# Patient Record
Sex: FEMALE | Race: WHITE | NOT HISPANIC OR LATINO | Employment: FULL TIME | URBAN - METROPOLITAN AREA
[De-identification: names, ages, dates, MRNs, and addresses within clinical notes are randomized per-mention and may not be internally consistent; named-entity substitution may affect disease eponyms.]

---

## 2017-07-26 ENCOUNTER — ALLSCRIPTS OFFICE VISIT (OUTPATIENT)
Dept: OTHER | Facility: OTHER | Age: 22
End: 2017-07-26

## 2018-01-15 NOTE — PROGRESS NOTES
Assessment    1  Encounter for contraceptive management (V25 9) (Z30 9)   2  Encounter for preventive health examination (V70 0) (Z00 00)    Plan  Encounter for contraceptive management    · Loestrin 1/20 (21) 1-20 MG-MCG Oral Tablet (Norethindrone Acet-Ethinyl Est); take  one tablet by mouth every day    Discussion/Summary  health maintenance visit Currently, she has an adequate exercise regimen  the risks and benefits of cervical cancer screening were discussed cervical cancer screening is not indicated Breast cancer screening: the risks and benefits of breast cancer screening were discussed, monthly self breast exam was advised and breast cancer screening is not indicated  Colorectal cancer screening: colorectal cancer screening is not indicated  The immunizations are up to date  Advice and education were given regarding nutrition, aerobic exercise, reproductive health, contraception, alcohol use, sunscreen use and seat belt use  Patient discussion: discussed with the patient  We had a lengthy discussion about OCP's and possible risks/benefits and possible side effects  She will use "Sunday start" method  Start pap at age 25  Follow up PRN or yearly  The patient was counseled regarding instructions for management, risk factor reductions  Chief Complaint  pt present for CPE  af/rma      History of Present Illness  HM, Adult Female: The patient is being seen for a health maintenance evaluation  General Health: The patient's health since the last visit is described as good  She has regular dental visits  She denies vision problems  She denies hearing loss  Immunizations status: up to date  Lifestyle:  She consumes a diverse and healthy diet  She does not have any weight concerns  She exercises regularly  She does not use tobacco  She denies alcohol use  She denies drug use  Reproductive health: the patient is premenopausal    Screening: cancer screening reviewed and updated     metabolic screening reviewed and updated  risk screening reviewed and updated  HPI: Here for CPE  Would like to talk about OCP's  Has been sexually active but not currently  Uses a condom  No family history of DVT, does not smoke  Review of Systems    Constitutional: No fever, no chills, feels well, no tiredness, no recent weight gain or weight loss  Eyes: No complaints of eye pain, no red eyes, no eyesight problems, no discharge, no dry eyes, no itching of eyes  ENT: no complaints of earache, no loss of hearing, no nose bleeds, no nasal discharge, no sore throat, no hoarseness  Cardiovascular: No complaints of slow heart rate, no fast heart rate, no chest pain, no palpitations, no leg claudication, no lower extremity edema  Respiratory: No complaints of shortness of breath, no wheezing, no cough, no SOB on exertion, no orthopnea, no PND  Gastrointestinal: No complaints of abdominal pain, no constipation, no nausea or vomiting, no diarrhea, no bloody stools  Genitourinary: No complaints of dysuria, no incontinence, no pelvic pain, no dysmenorrhea, no vaginal discharge or bleeding  Musculoskeletal: No complaints of arthralgias, no myalgias, no joint swelling or stiffness, no limb pain or swelling  Integumentary: No complaints of skin rash or lesions, no itching, no skin wounds, no breast pain or lump  Neurological: No complaints of headache, no confusion, no convulsions, no numbness, no dizziness or fainting, no tingling, no limb weakness, no difficulty walking  Psychiatric: Not suicidal, no sleep disturbance, no anxiety or depression, no change in personality, no emotional problems  Endocrine: No complaints of proptosis, no hot flashes, no muscle weakness, no deepening of the voice, no feelings of weakness  Hematologic/Lymphatic: No complaints of swollen glands, no swollen glands in the neck, does not bleed easily, does not bruise easily  Active Problems    1   Acute upper respiratory infection (465 9) (J06 9)   2  Arthralgia (719 40) (M25 50)   3  Encounter for routine child health examination w/o abnormal findings (V20 2) (Z00 129)   4  Exercise-induced bronchospasm (493 81) (J45 990)   5  Memory Lapses Or Loss (780 93)    Past Medical History    · History of Acute maxillary sinusitis (461 0) (J01 00)   · History of Acute Post-traumatic Stress Disorder (V15 4)   · Encounter for routine child health examination w/o abnormal findings (V20 2) (Z00 129)   · History of acute bronchitis (V12 69) (Z87 09)   · History of acute bronchitis (V12 69) (Z87 09)   · History of acute conjunctivitis (V12 49) (Z86 69)   · History of concussion (V15 52) (Z87 820)   · History of Late Effects Of Sprain Or Strain (905 7)   · History of Limb pain (729 5) (M79 609)   · History of Sprained Ribs (848 3)    Family History  Father    · Family history of Allergic Rhinitis    Social History    · Never A Smoker    Current Meds   1  Ventolin  (90 Base) MCG/ACT Inhalation Aerosol Solution; 2 puffs 30min prior to   exercise and every 4hrs prn shortness of breath, swish/spit after use; Therapy: [id number] to (Last Rx:[id number]) Ordered    Allergies    1  No Known Drug Allergies    Vitals   Recorded: [id number] 03:47PM   Temperature 97 1 F   Heart Rate 72   Respiration 16   Systolic 529   Diastolic 78   Height 5 ft 5 5 in   Weight 173 lb    BMI Calculated 28 35   BSA Calculated 1 87     Physical Exam    Constitutional   General appearance: No acute distress, well appearing and well nourished  Eyes   Conjunctiva and lids: No swelling, erythema or discharge  Pupils and irises: Equal, round, reactive to light  Ears, Nose, Mouth, and Throat   External inspection of ears and nose: Normal     Otoscopic examination: Tympanic membranes translucent with normal light reflex  Canals patent without erythema  Hearing: Normal     Nasal mucosa, septum, and turbinates: Normal without edema or erythema      Lips, teeth, and gums: Normal, good dentition  Oropharynx: Normal with no erythema, edema, exudate or lesions  Neck   Neck: Supple, symmetric, trachea midline, no masses  Thyroid: Normal, no thyromegaly  Pulmonary   Respiratory effort: No increased work of breathing or signs of respiratory distress  Percussion of chest: Normal     Palpation of chest: Normal     Auscultation of lungs: Clear to auscultation  Cardiovascular   Palpation of heart: Normal PMI, no thrills  Auscultation of heart: Normal rate and rhythm, normal S1 and S2, no murmurs  Carotid pulses: 2+ bilaterally  Abdominal aorta: Normal     Femoral pulses: 2+ bilaterally  Pedal pulses: 2+ bilaterally  Examination of extremities for edema and/or varicosities: Normal     Abdomen   Abdomen: Non-tender, no masses  Liver and spleen: No hepatomegaly or splenomegaly  Examination for hernias: No hernia appreciated  Lymphatic   Palpation of lymph nodes in neck: No lymphadenopathy  Palpation of lymph nodes in axillae: No lymphadenopathy  Palpation of lymph nodes in groin: No lymphadenopathy  Palpation of lymph nodes in other areas: No lymphadenopathy  Musculoskeletal   Gait and station: Normal     Digits and nails: Normal without clubbing or cyanosis  Joints, bones, and muscles: Normal     Range of motion: Normal     Stability: Normal     Muscle strength/tone: Normal     Skin   Skin and subcutaneous tissue: Normal without rashes or lesions  Palpation of skin and subcutaneous tissue: Normal turgor  Neurologic   Cranial nerves: Cranial nerves II-XII intact  Reflexes: 2+ and symmetric  Sensation: No sensory loss  Psychiatric   Judgment and insight: Normal     Orientation to person, place, and time: Normal     Recent and remote memory: Intact      Mood and affect: Normal        Procedure    Procedure:   Results: 20/30 in both eyes with corrective device, 20/40 in the right eye with corrective device, 20/25 in the left eye with corrective device      Signatures   Electronically signed by : JAYNE Funez ; Jul 26 2017  4:21PM EST                       (Author)

## 2018-01-22 VITALS
TEMPERATURE: 97.1 F | RESPIRATION RATE: 16 BRPM | DIASTOLIC BLOOD PRESSURE: 78 MMHG | HEART RATE: 72 BPM | SYSTOLIC BLOOD PRESSURE: 110 MMHG | BODY MASS INDEX: 27.8 KG/M2 | HEIGHT: 66 IN | WEIGHT: 173 LBS

## 2018-05-10 RX ORDER — NORETHINDRONE ACETATE AND ETHINYL ESTRADIOL 1; .02 MG/1; MG/1
1 TABLET ORAL DAILY
COMMUNITY
Start: 2017-07-26 | End: 2018-05-11

## 2018-05-10 RX ORDER — ALBUTEROL SULFATE 90 UG/1
AEROSOL, METERED RESPIRATORY (INHALATION)
COMMUNITY
Start: 2014-02-25 | End: 2018-09-19

## 2018-05-11 ENCOUNTER — OFFICE VISIT (OUTPATIENT)
Dept: FAMILY MEDICINE CLINIC | Facility: CLINIC | Age: 23
End: 2018-05-11
Payer: COMMERCIAL

## 2018-05-11 VITALS
HEIGHT: 65 IN | TEMPERATURE: 98 F | WEIGHT: 176 LBS | DIASTOLIC BLOOD PRESSURE: 82 MMHG | BODY MASS INDEX: 29.32 KG/M2 | RESPIRATION RATE: 18 BRPM | SYSTOLIC BLOOD PRESSURE: 108 MMHG | HEART RATE: 78 BPM

## 2018-05-11 DIAGNOSIS — H66.002 ACUTE SUPPURATIVE OTITIS MEDIA OF LEFT EAR WITHOUT SPONTANEOUS RUPTURE OF TYMPANIC MEMBRANE, RECURRENCE NOT SPECIFIED: Primary | ICD-10-CM

## 2018-05-11 PROCEDURE — 99213 OFFICE O/P EST LOW 20 MIN: CPT | Performed by: FAMILY MEDICINE

## 2018-05-11 RX ORDER — AZITHROMYCIN 250 MG/1
TABLET, FILM COATED ORAL
Qty: 6 TABLET | Refills: 0 | Status: SHIPPED | OUTPATIENT
Start: 2018-05-11 | End: 2018-05-16

## 2018-05-11 NOTE — PROGRESS NOTES
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        HPI  Few days  Dry then went cough  Worsening  np still  No fever but feverish  Sore throat   No n/v/c/d  No sick contacts really  Works in store  No otc tried but some cough drops      The following portions of the patient's history were reviewed and updated as appropriate: allergies, current medications, past family history, past medical history, past social history, past surgical history and problem list     Review of Systems   Respiratory: Positive for cough  Negative for wheezing  Current Outpatient Prescriptions   Medication Sig Dispense Refill    albuterol (VENTOLIN HFA) 90 mcg/act inhaler Inhale      norethindrone-ethinyl estradiol (LOESTRIN 1/20, 21,) 1-20 MG-MCG per tablet Take 1 tablet by mouth daily       No current facility-administered medications for this visit  Objective:    /82   Pulse 78   Temp 98 °F (36 7 °C)   Resp 18   Ht 5' 5" (1 651 m)   Wt 79 8 kg (176 lb)   LMP 04/11/2018   BMI 29 29 kg/m²        Physical Exam   Constitutional: She appears well-developed  HENT:   Head: Normocephalic  Right Ear: External ear normal    Left Ear: External ear normal    Mouth/Throat: No oropharyngeal exudate  Left TM red,coarse cough   Eyes: Conjunctivae are normal    Neck: Neck supple  Cardiovascular: Normal rate and intact distal pulses  Pulmonary/Chest: Effort normal  No respiratory distress  Abdominal: Soft  Musculoskeletal: She exhibits no edema or deformity  Neurological: She is alert  Skin: Skin is warm and dry  Psychiatric: Her behavior is normal  Thought content normal    Nursing note and vitals reviewed  Brenton Her, DO

## 2018-05-11 NOTE — PATIENT INSTRUCTIONS
Over-the-counter products may be used for your symptoms:    <<GUAIFENESIN>> is an expectorant that is useful for thick mucus  <<DEXTROMETHORPHAN>> is a cough suppressant that works in the brain to help reduce bothersome cough  <<ANTI-HISTAMINES>> are useful as allergy medications and to help dry up bothersome thin secretions  <<PSEUDOEPHEDRINE and PHENYLEPHRINE>> are stimulant decongestants that can be used for congestion and sinus pressure, however they be used with caution in those with high blood pressure or heart conditions      mucinex D is a suggestion today

## 2018-06-04 ENCOUNTER — OFFICE VISIT (OUTPATIENT)
Dept: FAMILY MEDICINE CLINIC | Facility: CLINIC | Age: 23
End: 2018-06-04
Payer: COMMERCIAL

## 2018-06-04 VITALS
SYSTOLIC BLOOD PRESSURE: 112 MMHG | RESPIRATION RATE: 16 BRPM | BODY MASS INDEX: 29.99 KG/M2 | HEIGHT: 65 IN | HEART RATE: 72 BPM | TEMPERATURE: 98.3 F | DIASTOLIC BLOOD PRESSURE: 72 MMHG | WEIGHT: 180 LBS

## 2018-06-04 DIAGNOSIS — J02.9 ACUTE PHARYNGITIS, UNSPECIFIED ETIOLOGY: Primary | ICD-10-CM

## 2018-06-04 LAB — S PYO AG THROAT QL: NEGATIVE

## 2018-06-04 PROCEDURE — 99213 OFFICE O/P EST LOW 20 MIN: CPT | Performed by: NURSE PRACTITIONER

## 2018-06-04 PROCEDURE — 87880 STREP A ASSAY W/OPTIC: CPT | Performed by: NURSE PRACTITIONER

## 2018-06-04 RX ORDER — FLUTICASONE PROPIONATE 50 MCG
2 SPRAY, SUSPENSION (ML) NASAL DAILY
Qty: 16 G | Refills: 0 | Status: SHIPPED | OUTPATIENT
Start: 2018-06-04 | End: 2021-10-13 | Stop reason: SDUPTHER

## 2018-06-04 NOTE — PATIENT INSTRUCTIONS
Start Flonase daily  Use Zyrtec D or Allegra D over the counter daily  Salt water gargles and hot tea with honey and lemon

## 2018-06-04 NOTE — PROGRESS NOTES
Assessment/Plan:    Problem List Items Addressed This Visit     None      Visit Diagnoses     Acute pharyngitis, unspecified etiology    -  Primary    Relevant Medications    fluticasone (FLONASE) 50 mcg/act nasal spray    Other Relevant Orders    POCT rapid strepA (Completed)          Patient Instructions   Start Flonase daily  Use Zyrtec D or Allegra D over the counter daily  Salt water gargles and hot tea with honey and lemon  Return if symptoms worsen or fail to improve  Subjective:      Patient ID: Sofi Orellana is a 25 y o  female  Chief Complaint   Patient presents with    Sore Throat     for 1 week prcma       She has had a sore throat and swollen tonsils for the past week  She has been feeling feverish  Mild sinus congestion and cough  She has been taking Advil OTC for symptoms  Treated for otitis media 1 month ago, ear pain resolved  No history of seasonal allergies  The following portions of the patient's history were reviewed and updated as appropriate: allergies, current medications, past family history, past medical history, past social history, past surgical history and problem list     Review of Systems   Constitutional: Positive for chills  Negative for fatigue and fever  HENT: Positive for congestion and sore throat  Negative for ear pain, postnasal drip, rhinorrhea and sinus pressure  Respiratory: Positive for cough  Negative for shortness of breath and wheezing  Cardiovascular: Negative for chest pain  Gastrointestinal: Negative for abdominal pain, diarrhea, nausea and vomiting  Musculoskeletal: Negative for arthralgias  Skin: Negative for rash  Neurological: Positive for headaches           Current Outpatient Prescriptions   Medication Sig Dispense Refill    albuterol (VENTOLIN HFA) 90 mcg/act inhaler Inhale      fluticasone (FLONASE) 50 mcg/act nasal spray 2 sprays into each nostril daily 16 g 0     No current facility-administered medications for this visit  Objective:    /72   Pulse 72   Temp 98 3 °F (36 8 °C)   Resp 16   Ht 5' 5" (1 651 m)   Wt 81 6 kg (180 lb)   LMP 05/16/2018 (Approximate)   BMI 29 95 kg/m²        Physical Exam   Constitutional: She appears well-developed and well-nourished  HENT:   Right Ear: Tympanic membrane, external ear and ear canal normal    Left Ear: Tympanic membrane, external ear and ear canal normal    Nose: Rhinorrhea (clear) present  No mucosal edema  Mouth/Throat: Oropharynx is clear and moist and mucous membranes are normal  No oropharyngeal exudate or posterior oropharyngeal erythema  Tonsils normal    Eyes: Conjunctivae are normal    Cardiovascular: Normal rate, regular rhythm and normal heart sounds  No murmur heard  Pulmonary/Chest: Effort normal and breath sounds normal    Abdominal: Bowel sounds are normal  She exhibits no distension  There is no splenomegaly or hepatomegaly  There is no tenderness  Lymphadenopathy:        Right cervical: No superficial cervical adenopathy present  Left cervical: No superficial cervical adenopathy present  Skin: No rash noted  Psychiatric: She has a normal mood and affect  Nursing note and vitals reviewed               Vinnie Revel

## 2018-07-19 ENCOUNTER — OFFICE VISIT (OUTPATIENT)
Dept: FAMILY MEDICINE CLINIC | Facility: CLINIC | Age: 23
End: 2018-07-19
Payer: COMMERCIAL

## 2018-07-19 VITALS
TEMPERATURE: 98.4 F | RESPIRATION RATE: 18 BRPM | HEIGHT: 65 IN | HEART RATE: 76 BPM | WEIGHT: 180 LBS | BODY MASS INDEX: 29.99 KG/M2 | SYSTOLIC BLOOD PRESSURE: 116 MMHG | DIASTOLIC BLOOD PRESSURE: 72 MMHG

## 2018-07-19 DIAGNOSIS — Z77.21 EXPOSURE TO POTENTIALLY HAZARDOUS BODY FLUIDS: Primary | ICD-10-CM

## 2018-07-19 PROCEDURE — 99213 OFFICE O/P EST LOW 20 MIN: CPT | Performed by: NURSE PRACTITIONER

## 2018-07-19 NOTE — PROGRESS NOTES
Assessment/Plan:    Cultures obtained  Will follow up after  Problem List Items Addressed This Visit     None      Visit Diagnoses     Exposure to potentially hazardous body fluids    -  Primary    Relevant Orders    Chlamydia/GC amplified DNA by PCR    VAGINOSIS DNA PROBE (AFFIRM)    Genital Comprehensive Culture          There are no Patient Instructions on file for this visit  No Follow-up on file  Subjective:      Patient ID: Leslie Macedo is a 25 y o  female  Chief Complaint   Patient presents with    Vaginal Discharge     needing to be tested   [facility]       Here today requesting screening for STIs  She received a phone call from a previous partner that he tested positive for chlamydia  They did not use a condom  She denies any symptoms such as vaginal discharge, itching, pelvic pain, or urinary symptoms  No other sexual partners  The following portions of the patient's history were reviewed and updated as appropriate: allergies, current medications, past family history, past medical history, past social history, past surgical history and problem list     Review of Systems   Constitutional: Negative  Genitourinary:        See HPI   All other systems reviewed and are negative  Current Outpatient Prescriptions   Medication Sig Dispense Refill    albuterol (VENTOLIN HFA) 90 mcg/act inhaler Inhale      fluticasone (FLONASE) 50 mcg/act nasal spray 2 sprays into each nostril daily 16 g 0     No current facility-administered medications for this visit  Objective:    /72   Pulse 76   Temp 98 4 °F (36 9 °C)   Resp 18   Ht 5' 5" (1 651 m)   Wt 81 6 kg (180 lb)   LMP 07/11/2018 (Approximate)   BMI 29 95 kg/m²        Physical Exam   Constitutional: She appears well-developed and well-nourished  HENT:   Right Ear: Tympanic membrane, external ear and ear canal normal    Left Ear: Tympanic membrane, external ear and ear canal normal    Nose: No mucosal edema  Mouth/Throat: Oropharynx is clear and moist and mucous membranes are normal    Eyes: Conjunctivae are normal    Cardiovascular: Normal rate, regular rhythm and normal heart sounds  Pulmonary/Chest: Effort normal and breath sounds normal    Abdominal: Bowel sounds are normal  She exhibits no distension  There is no splenomegaly or hepatomegaly  There is no tenderness  Genitourinary: There is no lesion on the right labia  There is no lesion on the left labia  Cervix exhibits no motion tenderness  Vaginal discharge (thin/white) found  Lymphadenopathy:        Right cervical: No superficial cervical adenopathy present  Left cervical: No superficial cervical adenopathy present  Skin: No rash noted  Psychiatric: She has a normal mood and affect  Nursing note and vitals reviewed               Tasha Peña

## 2018-07-23 ENCOUNTER — TELEPHONE (OUTPATIENT)
Dept: FAMILY MEDICINE CLINIC | Facility: CLINIC | Age: 23
End: 2018-07-23

## 2018-07-23 DIAGNOSIS — N76.0 BACTERIAL VAGINOSIS: Primary | ICD-10-CM

## 2018-07-23 DIAGNOSIS — B96.89 BACTERIAL VAGINOSIS: Primary | ICD-10-CM

## 2018-07-23 LAB
C TRACH RRNA SPEC QL NAA+PROBE: NOT DETECTED
N GONORRHOEA RRNA SPEC QL NAA+PROBE: NOT DETECTED

## 2018-07-23 RX ORDER — METRONIDAZOLE 7.5 MG/G
1 GEL VAGINAL
Qty: 70 G | Refills: 0 | Status: SHIPPED | OUTPATIENT
Start: 2018-07-23 | End: 2018-07-28

## 2018-07-23 NOTE — TELEPHONE ENCOUNTER
L/M for pt to call back regarding cultures  Positive for bacterial vaginosis  STI screening was negative  Escribed [employer] to the pharmacy

## 2018-09-19 ENCOUNTER — OFFICE VISIT (OUTPATIENT)
Dept: FAMILY MEDICINE CLINIC | Facility: CLINIC | Age: 23
End: 2018-09-19
Payer: COMMERCIAL

## 2018-09-19 VITALS
RESPIRATION RATE: 18 BRPM | SYSTOLIC BLOOD PRESSURE: 138 MMHG | HEIGHT: 65 IN | TEMPERATURE: 97.6 F | WEIGHT: 181.8 LBS | BODY MASS INDEX: 30.29 KG/M2 | HEART RATE: 88 BPM | DIASTOLIC BLOOD PRESSURE: 80 MMHG

## 2018-09-19 DIAGNOSIS — F32.A ANXIETY AND DEPRESSION: Primary | ICD-10-CM

## 2018-09-19 DIAGNOSIS — F41.9 ANXIETY AND DEPRESSION: Primary | ICD-10-CM

## 2018-09-19 PROBLEM — H66.002 ACUTE SUPPURATIVE OTITIS MEDIA OF LEFT EAR WITHOUT SPONTANEOUS RUPTURE OF TYMPANIC MEMBRANE: Status: RESOLVED | Noted: 2018-05-11 | Resolved: 2018-09-19

## 2018-09-19 PROCEDURE — 3008F BODY MASS INDEX DOCD: CPT | Performed by: NURSE PRACTITIONER

## 2018-09-19 PROCEDURE — 99214 OFFICE O/P EST MOD 30 MIN: CPT | Performed by: NURSE PRACTITIONER

## 2018-09-19 RX ORDER — ESCITALOPRAM OXALATE 10 MG/1
10 TABLET ORAL DAILY
Qty: 30 TABLET | Refills: 1 | Status: SHIPPED | OUTPATIENT
Start: 2018-09-19 | End: 2018-10-22 | Stop reason: SDUPTHER

## 2018-09-19 NOTE — PROGRESS NOTES
Assessment/Plan:  Total time of visit 30 minutes, of which 20 minutes was spent counseling  Problem List Items Addressed This Visit        Other    Anxiety and depression - Primary     Reviewed PHQ-9  Discussed purpose and side effects of SSRIs  Agreed to start Lexapro  Will follow up in 4-6 weeks and she will continue to see her counselor as needed         Relevant Medications    escitalopram (LEXAPRO) 10 mg tablet          There are no Patient Instructions on file for this visit  Return in about 4 weeks (around 10/17/2018)  Subjective:      Patient ID: Shubham Rosenberg is a 25 y o  female  Chief Complaint   Patient presents with    Depression     tried therapy, not working for her [facility]        Here today to discuss issues with depression and anxiety  This has been ongoing for quite some time  She has been in counseling for the past 6 years  She has had family issues and feels that her depression is surfacing again  She would like to discuss medication options  She has never taken anything in the past   Denies SI/HI        The following portions of the patient's history were reviewed and updated as appropriate: allergies, current medications, past family history, past medical history, past social history, past surgical history and problem list     Review of Systems   Constitutional: Negative  Respiratory: Negative for shortness of breath  Cardiovascular: Negative for chest pain  Psychiatric/Behavioral:        See HPI         Current Outpatient Prescriptions   Medication Sig Dispense Refill    fluticasone (FLONASE) 50 mcg/act nasal spray 2 sprays into each nostril daily 16 g 0    escitalopram (LEXAPRO) 10 mg tablet Take 1 tablet (10 mg total) by mouth daily 30 tablet 1     No current facility-administered medications for this visit          Objective:    /80   Pulse 88   Temp 97 6 °F (36 4 °C)   Resp 18   Ht 5' 5" (1 651 m)   Wt 82 5 kg (181 lb 12 8 oz)   LMP 09/05/2018 BMI 30 25 kg/m²        Physical Exam   Constitutional: She appears well-developed and well-nourished  HENT:   Right Ear: Tympanic membrane, external ear and ear canal normal    Left Ear: Tympanic membrane, external ear and ear canal normal    Nose: No mucosal edema  Mouth/Throat: Oropharynx is clear and moist and mucous membranes are normal    Eyes: Conjunctivae are normal    Cardiovascular: Normal rate, regular rhythm and normal heart sounds  Pulmonary/Chest: Effort normal and breath sounds normal    Abdominal: Bowel sounds are normal  She exhibits no distension  There is no splenomegaly or hepatomegaly  There is no tenderness  Lymphadenopathy:        Right cervical: No superficial cervical adenopathy present  Left cervical: No superficial cervical adenopathy present  Skin: No rash noted  Psychiatric: She has a normal mood and affect  Nursing note and vitals reviewed               Heriberto Johnson

## 2018-09-19 NOTE — ASSESSMENT & PLAN NOTE
Reviewed PHQ-9  Discussed purpose and side effects of SSRIs  Agreed to start Lexapro    Will follow up in 4-6 weeks and she will continue to see her counselor as needed

## 2018-10-22 ENCOUNTER — OFFICE VISIT (OUTPATIENT)
Dept: FAMILY MEDICINE CLINIC | Facility: CLINIC | Age: 23
End: 2018-10-22
Payer: COMMERCIAL

## 2018-10-22 VITALS
BODY MASS INDEX: 31.16 KG/M2 | SYSTOLIC BLOOD PRESSURE: 120 MMHG | WEIGHT: 187 LBS | TEMPERATURE: 97.2 F | RESPIRATION RATE: 18 BRPM | HEART RATE: 80 BPM | HEIGHT: 65 IN | DIASTOLIC BLOOD PRESSURE: 80 MMHG

## 2018-10-22 DIAGNOSIS — F41.9 ANXIETY AND DEPRESSION: Primary | ICD-10-CM

## 2018-10-22 DIAGNOSIS — F32.A ANXIETY AND DEPRESSION: Primary | ICD-10-CM

## 2018-10-22 PROCEDURE — 99213 OFFICE O/P EST LOW 20 MIN: CPT | Performed by: NURSE PRACTITIONER

## 2018-10-22 PROCEDURE — 1036F TOBACCO NON-USER: CPT | Performed by: NURSE PRACTITIONER

## 2018-10-22 PROCEDURE — 3008F BODY MASS INDEX DOCD: CPT | Performed by: NURSE PRACTITIONER

## 2018-10-22 RX ORDER — ESCITALOPRAM OXALATE 20 MG/1
20 TABLET ORAL DAILY
Qty: 90 TABLET | Refills: 1 | Status: SHIPPED | OUTPATIENT
Start: 2018-10-22 | End: 2018-11-26 | Stop reason: SDUPTHER

## 2018-10-22 NOTE — PROGRESS NOTES
Assessment/Plan:    Problem List Items Addressed This Visit        Other    Anxiety and depression - Primary     PHQ reviewed, improved score  Will increase Lexapro to 20mg daily  RTO in 6 months or sooner prn         Relevant Medications    escitalopram (LEXAPRO) 20 mg tablet          There are no Patient Instructions on file for this visit  Return in about 6 months (around 4/22/2019)  Subjective:      Patient ID: Sheng Walker is a 26 y o  female  Chief Complaint   Patient presents with    Follow-up     1 month Med F/U       Here today for f/u anxiety and depression  She is tolerating the Lexapro well  She notices an improvement in her mood and her coworkers have made comments to her as well  She would like to increase the dose  Reports no adverse reactions  The following portions of the patient's history were reviewed and updated as appropriate: allergies, current medications, past family history, past medical history, past social history, past surgical history and problem list     Review of Systems   Constitutional: Negative  Respiratory: Negative for cough and shortness of breath  Cardiovascular: Negative for chest pain  Gastrointestinal: Negative for abdominal pain, diarrhea, nausea and vomiting  Psychiatric/Behavioral:        See HPI   All other systems reviewed and are negative  Current Outpatient Prescriptions   Medication Sig Dispense Refill    escitalopram (LEXAPRO) 20 mg tablet Take 1 tablet (20 mg total) by mouth daily 90 tablet 1    fluticasone (FLONASE) 50 mcg/act nasal spray 2 sprays into each nostril daily 16 g 0     No current facility-administered medications for this visit  Objective:    /80   Pulse 80   Temp (!) 97 2 °F (36 2 °C)   Resp 18   Ht 5' 5" (1 651 m)   Wt 84 8 kg (187 lb)   LMP 10/01/2018   BMI 31 12 kg/m²        Physical Exam   Constitutional: She appears well-developed and well-nourished     Cardiovascular: Normal rate, regular rhythm and normal heart sounds  No murmur heard  Pulmonary/Chest: Effort normal and breath sounds normal    Neurological: She is alert  Skin: Skin is warm and dry  Psychiatric: She has a normal mood and affect  Nursing note and vitals reviewed               Luis Barclay

## 2018-11-26 DIAGNOSIS — F32.A ANXIETY AND DEPRESSION: ICD-10-CM

## 2018-11-26 DIAGNOSIS — F41.9 ANXIETY AND DEPRESSION: ICD-10-CM

## 2018-11-26 RX ORDER — ESCITALOPRAM OXALATE 20 MG/1
20 TABLET ORAL DAILY
Qty: 90 TABLET | Refills: 1 | Status: SHIPPED | OUTPATIENT
Start: 2018-11-26 | End: 2018-12-26 | Stop reason: SDUPTHER

## 2018-12-26 DIAGNOSIS — F41.9 ANXIETY AND DEPRESSION: ICD-10-CM

## 2018-12-26 DIAGNOSIS — F32.A ANXIETY AND DEPRESSION: ICD-10-CM

## 2018-12-27 RX ORDER — ESCITALOPRAM OXALATE 20 MG/1
20 TABLET ORAL DAILY
Qty: 90 TABLET | Refills: 1 | Status: SHIPPED | OUTPATIENT
Start: 2018-12-27 | End: 2019-02-01 | Stop reason: SDUPTHER

## 2018-12-27 NOTE — TELEPHONE ENCOUNTER
From: Domitila Viera  Sent: 12/26/2018 7:11 PM EST  Subject: Medication Renewal Request    Leslie Kenny would like a refill of the following medications:     escitalopram (LEXAPRO) 20 mg tablet PATTI Coleman]    [id number]    Comment:

## 2019-02-01 DIAGNOSIS — F32.A ANXIETY AND DEPRESSION: ICD-10-CM

## 2019-02-01 DIAGNOSIS — F41.9 ANXIETY AND DEPRESSION: ICD-10-CM

## 2019-02-04 RX ORDER — ESCITALOPRAM OXALATE 20 MG/1
20 TABLET ORAL DAILY
Qty: 90 TABLET | Refills: 1 | Status: SHIPPED | OUTPATIENT
Start: 2019-02-04 | End: 2019-03-20 | Stop reason: SDUPTHER

## 2019-02-04 NOTE — TELEPHONE ENCOUNTER
From: Abeba Cheema  Sent: 2/1/2019 7:10 PM EST  Subject: Medication Renewal Request    Bethany Mohan would like a refill of the following medications:     escitalopram (LEXAPRO) 20 mg tablet PATTI Ramachandran]    [id number]    Comment:

## 2019-03-20 DIAGNOSIS — F32.A ANXIETY AND DEPRESSION: ICD-10-CM

## 2019-03-20 DIAGNOSIS — F41.9 ANXIETY AND DEPRESSION: ICD-10-CM

## 2019-03-21 RX ORDER — ESCITALOPRAM OXALATE 20 MG/1
20 TABLET ORAL DAILY
Qty: 90 TABLET | Refills: 0 | Status: SHIPPED | OUTPATIENT
Start: 2019-03-21 | End: 2019-07-30 | Stop reason: SDUPTHER

## 2019-04-22 ENCOUNTER — TELEPHONE (OUTPATIENT)
Dept: FAMILY MEDICINE CLINIC | Facility: CLINIC | Age: 24
End: 2019-04-22

## 2019-06-10 ENCOUNTER — TELEPHONE (OUTPATIENT)
Dept: FAMILY MEDICINE CLINIC | Facility: CLINIC | Age: 24
End: 2019-06-10

## 2019-07-09 ENCOUNTER — OFFICE VISIT (OUTPATIENT)
Dept: FAMILY MEDICINE CLINIC | Facility: CLINIC | Age: 24
End: 2019-07-09
Payer: COMMERCIAL

## 2019-07-09 VITALS
HEART RATE: 84 BPM | SYSTOLIC BLOOD PRESSURE: 126 MMHG | HEIGHT: 65 IN | DIASTOLIC BLOOD PRESSURE: 82 MMHG | RESPIRATION RATE: 18 BRPM | WEIGHT: 195.2 LBS | TEMPERATURE: 98 F | BODY MASS INDEX: 32.52 KG/M2

## 2019-07-09 DIAGNOSIS — B36.0 TINEA VERSICOLOR: ICD-10-CM

## 2019-07-09 DIAGNOSIS — Z23 NEED FOR VACCINATION: ICD-10-CM

## 2019-07-09 DIAGNOSIS — Z30.015 ENCOUNTER FOR INITIAL PRESCRIPTION OF VAGINAL RING HORMONAL CONTRACEPTIVE: ICD-10-CM

## 2019-07-09 DIAGNOSIS — Z00.00 ROUTINE ADULT HEALTH MAINTENANCE: Primary | ICD-10-CM

## 2019-07-09 DIAGNOSIS — E66.9 CLASS 1 OBESITY WITHOUT SERIOUS COMORBIDITY WITH BODY MASS INDEX (BMI) OF 32.0 TO 32.9 IN ADULT, UNSPECIFIED OBESITY TYPE: ICD-10-CM

## 2019-07-09 DIAGNOSIS — Z11.3 SCREENING EXAMINATION FOR STD (SEXUALLY TRANSMITTED DISEASE): ICD-10-CM

## 2019-07-09 DIAGNOSIS — Z01.419 ENCOUNTER FOR CERVICAL PAP SMEAR WITH PELVIC EXAM: ICD-10-CM

## 2019-07-09 DIAGNOSIS — J45.990 EXERCISE-INDUCED BRONCHOSPASM: ICD-10-CM

## 2019-07-09 PROCEDURE — 99395 PREV VISIT EST AGE 18-39: CPT | Performed by: NURSE PRACTITIONER

## 2019-07-09 PROCEDURE — 90715 TDAP VACCINE 7 YRS/> IM: CPT

## 2019-07-09 PROCEDURE — 90471 IMMUNIZATION ADMIN: CPT

## 2019-07-09 RX ORDER — SELENIUM SULFIDE 2.5 MG/100ML
LOTION TOPICAL DAILY
Qty: 118 ML | Refills: 1 | Status: SHIPPED | OUTPATIENT
Start: 2019-07-09 | End: 2019-07-10

## 2019-07-09 RX ORDER — ETONOGESTREL AND ETHINYL ESTRADIOL 11.7; 2.7 MG/1; MG/1
INSERT, EXTENDED RELEASE VAGINAL
Qty: 3 EACH | Refills: 3 | Status: SHIPPED | OUTPATIENT
Start: 2019-07-09 | End: 2019-07-30 | Stop reason: SDUPTHER

## 2019-07-09 RX ORDER — ALBUTEROL SULFATE 90 UG/1
2 AEROSOL, METERED RESPIRATORY (INHALATION) ONCE
Qty: 8.5 G | Refills: 0 | Status: SHIPPED | OUTPATIENT
Start: 2019-07-09 | End: 2019-07-09

## 2019-07-09 NOTE — PROGRESS NOTES
FAMILY PRACTICE HEALTH MAINTENANCE OFFICE VISIT  [facility]    NAME: Annette Orosco  AGE: 26 y o  SEX: female  : 1995     DATE: 2019    Assessment and Plan   here  Problem List Items Addressed This Visit        Respiratory    Exercise-induced bronchospasm     Reviewed how to use inhaler         Relevant Medications    albuterol (PROAIR HFA) 90 mcg/act inhaler       Other    Obesity (BMI 30-39  9)     Reviewed lifestyle measures  Increase exercise           Other Visit Diagnoses     Routine adult health maintenance    -  Primary    Encounter for cervical Pap smear with pelvic exam        Relevant Orders    Thinprep Pap Reflex HPV mRNA E6/E7    Need for vaccination        Relevant Orders    TDAP VACCINE GREATER THAN OR EQUAL TO 6YO IM (Completed)    Tinea versicolor        Relevant Medications    selenium sulfide (SELSUN) 2 5 % shampoo    Encounter for initial prescription of vaginal ring hormonal contraceptive        Relevant Medications    etonogestrel-ethinyl estradiol (NUVARING) 0 12-0 015 MG/24HR vaginal ring    Screening examination for STD (sexually transmitted disease)        Relevant Orders    Chlamydia/GC amplified DNA by PCR            · Patient Counseling:   · Nutrition: Stressed importance of a well balanced diet, moderation of sodium/saturated fat, caloric balance and sufficient intake of fiber  · Exercise: Stressed the importance of regular exercise with a goal of 150 minutes per week  · Dental Health: Discussed daily flossing and brushing and regular dental visits     · Immunizations reviewed: adacel given today  · Discussed benefits of screening: monthly self breast exams, reviewed pap guidelines   BMI Counseling: Body mass index is 32 48 kg/m²  Discussed with patient's BMI with her  The BMI is above average  BMI counseling and education was provided to the patient   Exercise recommendations include moderate aerobic physical activity for 150 minutes/week  Return for Annual physical         Chief Complaint     Chief Complaint   Patient presents with    Physical Exam     [facility]       History of Present Illness     Here today for CPE  History of exercise induced asthma  She is requesting a prescription for Albuterol inhaler  Has had PFT in the past    Would also like to discuss Nuva Ring  She does not think she will remember to take a pill  No prior pap  Has a rash on her chest, upper back, and underneath breasts  Not itchy or painful  Has been present for over a year and is worse in the summer months  Well Adult Physical   Patient here for a comprehensive physical exam       Diet and Physical Activity  Diet: well balanced diet  Exercise: frequently      Depression Screen  PHQ-9 Depression Screening    PHQ-9:    Frequency of the following problems over the past two weeks:               General Health  Hearing: Normal:  bilateral  Vision: most recent eye exam >1 year and wears contacts  Dental: regular dental visits and brushes teeth twice daily    Reproductive Health  Multiple partners  Uses condoms  Cycles regular  No heavy bleeding or severe cramping      The following portions of the patient's history were reviewed and updated as appropriate: allergies, current medications, past family history, past medical history, past social history, past surgical history and problem list     Review of Systems     Review of Systems   Constitutional: Negative  Respiratory: Positive for shortness of breath (with exercise)  Cardiovascular: Negative for chest pain, palpitations and leg swelling  Genitourinary:        See HPI   Neurological: Negative for dizziness and headaches  Psychiatric/Behavioral: Negative          Past Medical History     Past Medical History:   Diagnosis Date    Acute post-traumatic stress disorder     Concussion     resolved 12/20/2016       Past Surgical History     Past Surgical History:   Procedure Laterality Date    NO PAST SURGERIES         Social History     Social History     Socioeconomic History    Marital status: Single     Spouse name: None    Number of children: None    Years of education: None    Highest education level: None   Occupational History    None   Social Needs    Financial resource strain: None    Food insecurity:     Worry: None     Inability: None    Transportation needs:     Medical: None     Non-medical: None   Tobacco Use    Smoking status: Never Smoker    Smokeless tobacco: Never Used   Substance and Sexual Activity    Alcohol use: Yes     Comment: social    Drug use: No    Sexual activity: Yes     Partners: Male     Birth control/protection: Condom Male   Lifestyle    Physical activity:     Days per week: None     Minutes per session: None    Stress: None   Relationships    Social connections:     Talks on phone: None     Gets together: None     Attends L[employer] day service: None     Active member of club or organization: None     Attends meetings of clubs or organizations: None     Relationship status: None    Intimate partner violence:     Fear of current or ex partner: None     Emotionally abused: None     Physically abused: None     Forced sexual activity: None   Other Topics Concern    None   Social History Narrative    None       Family History     Family History   Problem Relation Age of Onset    Allergic rhinitis Father        Current Medications       Current Outpatient Medications:     escitalopram (LEXAPRO) 20 mg tablet, Take 1 tablet (20 mg total) by mouth daily, Disp: 90 tablet, Rfl: 0    fluticasone (FLONASE) 50 mcg/act nasal spray, 2 sprays into each nostril daily, Disp: 16 g, Rfl: 0    albuterol (PROAIR HFA) 90 mcg/act inhaler, Inhale 2 puffs once for 1 dose 15-30 minutes prior to exercise, Disp: 8 5 g, Rfl: 0    etonogestrel-ethinyl estradiol (NUVARING) 0 12-0 015 MG/24HR vaginal ring, Insert vaginally and leave in place for 3 consecutive weeks, then remove for 1 week , Disp: 3 each, Rfl: 3    selenium sulfide (SELSUN) 2 5 % shampoo, Apply topically daily Sit for 10 minutse and then rinse  Once daily for 7 days, Disp: 118 mL, Rfl: 1     Allergies     No Known Allergies    Objective     /82   Pulse 84   Temp 98 °F (36 7 °C)   Resp 18   Ht 5' 5" (1 651 m)   Wt 88 5 kg (195 lb 3 2 oz)   LMP 06/18/2019 (Exact Date)   BMI 32 48 kg/m²      Physical Exam   Constitutional: She is oriented to person, place, and time  She appears well-developed and well-nourished  HENT:   Head: Normocephalic  Right Ear: External ear normal    Left Ear: External ear normal    Nose: Nose normal    Mouth/Throat: Oropharynx is clear and moist    Eyes: Pupils are equal, round, and reactive to light  Conjunctivae and EOM are normal    Neck: Neck supple  No thyromegaly present  Cardiovascular: Normal rate, regular rhythm, normal heart sounds and intact distal pulses  No murmur heard  Pulmonary/Chest: Effort normal and breath sounds normal  Right breast exhibits no inverted nipple, no mass, no nipple discharge, no skin change and no tenderness  Left breast exhibits no inverted nipple, no mass, no nipple discharge, no skin change and no tenderness  Abdominal: Soft  Bowel sounds are normal  She exhibits no distension  Genitourinary: Vagina normal  There is no rash or lesion on the right labia  There is no rash or lesion on the left labia  Uterus is not enlarged and not tender  Cervix exhibits no motion tenderness and no discharge  Right adnexum displays no mass and no tenderness  Left adnexum displays no mass and no tenderness  Genitourinary Comments: Pap smear done   Musculoskeletal: Normal range of motion  She exhibits no edema  Lymphadenopathy:     She has no cervical adenopathy  No inguinal adenopathy noted on the right or left side  Neurological: She is alert and oriented to person, place, and time  She has normal reflexes     Skin: Skin is warm, dry and intact  Hypopigmented rash involving chest, upper back, and underneath breasts   Psychiatric: She has a normal mood and affect  Nursing note and vitals reviewed           Visual Acuity Screening    Right eye Left eye Both eyes   Without correction:      With correction: 20/70 20/70 20/70   Comments: Pt has contacts however needs to see the eye dr Shahnaz Alex lpn          [facility], [address] [facility]

## 2019-07-10 ENCOUNTER — TELEPHONE (OUTPATIENT)
Dept: FAMILY MEDICINE CLINIC | Facility: CLINIC | Age: 24
End: 2019-07-10

## 2019-07-10 DIAGNOSIS — B36.0 TINEA VERSICOLOR: Primary | ICD-10-CM

## 2019-07-10 RX ORDER — SELENIUM SULFIDE 22.5 MG/ML
SHAMPOO TOPICAL
Qty: 180 ML | Refills: 1 | Status: SHIPPED | OUTPATIENT
Start: 2019-07-10 | End: 2019-09-08 | Stop reason: SDUPTHER

## 2019-07-10 NOTE — TELEPHONE ENCOUNTER
Pharmacy called, stated they do not have selenium shamoo available in 2 5%, asking for 2 25%  Please send if acceptable    [facility]

## 2019-07-11 LAB
C TRACH RRNA SPEC QL NAA+PROBE: NOT DETECTED
CLINICAL INFO: NORMAL
CYTO CVX: NORMAL
DATE PREVIOUS BX: NORMAL
LMP START DATE: NORMAL
N GONORRHOEA RRNA SPEC QL NAA+PROBE: NOT DETECTED
SL AMB PREV. PAP:: NORMAL
SPECIMEN SOURCE CVX/VAG CYTO: NORMAL

## 2019-07-23 ENCOUNTER — OFFICE VISIT (OUTPATIENT)
Dept: FAMILY MEDICINE CLINIC | Facility: CLINIC | Age: 24
End: 2019-07-23
Payer: COMMERCIAL

## 2019-07-23 VITALS
DIASTOLIC BLOOD PRESSURE: 80 MMHG | HEIGHT: 65 IN | BODY MASS INDEX: 32.72 KG/M2 | TEMPERATURE: 98.4 F | SYSTOLIC BLOOD PRESSURE: 120 MMHG | WEIGHT: 196.4 LBS | HEART RATE: 72 BPM

## 2019-07-23 DIAGNOSIS — R10.2 PELVIC PAIN: Primary | ICD-10-CM

## 2019-07-23 DIAGNOSIS — G44.229 CHRONIC TENSION-TYPE HEADACHE, NOT INTRACTABLE: ICD-10-CM

## 2019-07-23 PROBLEM — G89.29 CHRONIC HEADACHES: Status: ACTIVE | Noted: 2019-07-23

## 2019-07-23 PROBLEM — R51.9 CHRONIC HEADACHES: Status: ACTIVE | Noted: 2019-07-23

## 2019-07-23 PROCEDURE — 3008F BODY MASS INDEX DOCD: CPT | Performed by: NURSE PRACTITIONER

## 2019-07-23 PROCEDURE — 99213 OFFICE O/P EST LOW 20 MIN: CPT | Performed by: NURSE PRACTITIONER

## 2019-07-23 PROCEDURE — 1036F TOBACCO NON-USER: CPT | Performed by: NURSE PRACTITIONER

## 2019-07-23 RX ORDER — ALBUTEROL SULFATE 90 UG/1
AEROSOL, METERED RESPIRATORY (INHALATION)
Refills: 0 | COMMUNITY
Start: 2019-07-09 | End: 2021-10-13 | Stop reason: SDUPTHER

## 2019-07-23 NOTE — PROGRESS NOTES
Assessment/Plan:    Offered a trial of muscle relaxants for tension headaches, however she reports these make her very groggy  She is requesting a referral for neurology given history of concussions  Will f/u after pelvic US    Problem List Items Addressed This Visit        Other    Chronic headaches    Relevant Orders    Ambulatory referral to Neurology      Other Visit Diagnoses     Pelvic pain    -  Primary    Relevant Orders    US pelvis complete w transvaginal            v  There are no Patient Instructions on file for this visit  Return if symptoms worsen or fail to improve  Subjective:      Patient ID: Anastacia Holt is a 26 y o  female  Chief Complaint   Patient presents with    Abdominal Pain     for 1 month prcma    Nausea    Headache       Here today with complaints of pelvic pain that has been ongoing for the past month  This occurs daily in her lower pelvis, radiating to both sides  Pain occurs every couple of hours and lasts for 1-2 minutes  No relationship to bowel movements, meals, or urination  Has not taken anything OTC for this  Pains have been occurring every day for about a month  Cramps occur every couple of hours and last for a couple of minuets  Pains are sharp and stabbing  Also c/o frequent headaches  They are frontal headaches and occur daily  This has been ongoing for years  She has had numerous concussions in the past secondary to cheerleading  The following portions of the patient's history were reviewed and updated as appropriate: allergies, current medications, past family history, past medical history, past social history, past surgical history and problem list     Review of Systems   Constitutional: Negative for chills, fatigue and fever  Respiratory: Negative for cough, shortness of breath and wheezing  Cardiovascular: Negative for chest pain, palpitations and leg swelling     Gastrointestinal: Negative for abdominal pain, diarrhea, nausea and vomiting  Genitourinary: Positive for pelvic pain  Skin: Negative for rash  Neurological: Positive for headaches  Negative for dizziness  Current Outpatient Medications   Medication Sig Dispense Refill    albuterol (PROVENTIL HFA,VENTOLIN HFA) 90 mcg/act inhaler INHALE 2 PUFFS BY MOUTH ONCE FOR 1 DOSE 15 TO 30 MINUTES PRIOR TO EXERCISE  0    escitalopram (LEXAPRO) 20 mg tablet Take 1 tablet (20 mg total) by mouth daily 90 tablet 0    etonogestrel-ethinyl estradiol (NUVARING) 0 12-0 015 MG/24HR vaginal ring Insert vaginally and leave in place for 3 consecutive weeks, then remove for 1 week  3 each 3    fluticasone (FLONASE) 50 mcg/act nasal spray 2 sprays into each nostril daily 16 g 0    Selenium Sulfide 2 25 % SHAM Use daily for 7 days  Lather and rinse after 10 minutes 180 mL 1     No current facility-administered medications for this visit  Objective:    /80   Pulse 72   Temp 98 4 °F (36 9 °C)   Ht 5' 5" (1 651 m)   Wt 89 1 kg (196 lb 6 4 oz)   BMI 32 68 kg/m²        Physical Exam   Constitutional: She appears well-developed and well-nourished  HENT:   Head: Normocephalic and atraumatic  Right Ear: Tympanic membrane, external ear and ear canal normal    Left Ear: Tympanic membrane, external ear and ear canal normal    Nose: No mucosal edema or rhinorrhea  Mouth/Throat: Uvula is midline, oropharynx is clear and moist and mucous membranes are normal    Eyes: Conjunctivae are normal    Neck: Neck supple  No edema present  No thyromegaly present  Cardiovascular: Normal rate, regular rhythm, normal heart sounds and intact distal pulses  No murmur heard  Pulmonary/Chest: Effort normal and breath sounds normal    Abdominal: Bowel sounds are normal  She exhibits no distension  There is no splenomegaly or hepatomegaly  There is tenderness (pelvis)  Musculoskeletal:        Cervical back: She exhibits tenderness     Lymphadenopathy:        Right cervical: No superficial cervical adenopathy present  Left cervical: No superficial cervical adenopathy present  Skin: Skin is warm, dry and intact  No rash noted  Psychiatric: She has a normal mood and affect  Nursing note and vitals reviewed               Shannon Lay

## 2019-07-29 ENCOUNTER — HOSPITAL ENCOUNTER (OUTPATIENT)
Dept: RADIOLOGY | Facility: HOSPITAL | Age: 24
Discharge: HOME | End: 2019-07-29
Payer: COMMERCIAL

## 2019-07-29 DIAGNOSIS — R10.2 PELVIC PAIN: ICD-10-CM

## 2019-07-29 PROCEDURE — 76856 US EXAM PELVIC COMPLETE: CPT

## 2019-07-29 PROCEDURE — 76830 TRANSVAGINAL US NON-OB: CPT

## 2019-07-30 DIAGNOSIS — F41.9 ANXIETY AND DEPRESSION: ICD-10-CM

## 2019-07-30 DIAGNOSIS — F32.A ANXIETY AND DEPRESSION: ICD-10-CM

## 2019-07-30 DIAGNOSIS — Z30.015 ENCOUNTER FOR INITIAL PRESCRIPTION OF VAGINAL RING HORMONAL CONTRACEPTIVE: ICD-10-CM

## 2019-07-30 RX ORDER — ETONOGESTREL AND ETHINYL ESTRADIOL 11.7; 2.7 MG/1; MG/1
INSERT, EXTENDED RELEASE VAGINAL
Qty: 3 EACH | Refills: 3 | Status: SHIPPED | OUTPATIENT
Start: 2019-07-30 | End: 2019-08-09 | Stop reason: SDUPTHER

## 2019-07-30 RX ORDER — ESCITALOPRAM OXALATE 20 MG/1
20 TABLET ORAL DAILY
Qty: 90 TABLET | Refills: 1 | Status: SHIPPED | OUTPATIENT
Start: 2019-07-30 | End: 2019-08-09 | Stop reason: SDUPTHER

## 2019-08-05 ENCOUNTER — TELEPHONE (OUTPATIENT)
Dept: FAMILY MEDICINE CLINIC | Facility: CLINIC | Age: 24
End: 2019-08-05

## 2019-08-05 DIAGNOSIS — R35.0 URINARY FREQUENCY: Primary | ICD-10-CM

## 2019-08-05 NOTE — TELEPHONE ENCOUNTER
Pt called back after Stanley Kline had left for the day, she requests Tennille try calling her back around 10am tomorrow

## 2019-08-06 NOTE — TELEPHONE ENCOUNTER
I spoke w/ pt regarding results  Normal pelvic US  She is still getting sharp, shooting pains in her lower abdomen/pelvis region, although not as frequent  She also notes that she urinating frequently throughout the day    Will check UA and consider IC eval if her symptoms persist  Papo HusbandsPATTI

## 2019-08-09 DIAGNOSIS — F32.A ANXIETY AND DEPRESSION: ICD-10-CM

## 2019-08-09 DIAGNOSIS — Z30.015 ENCOUNTER FOR INITIAL PRESCRIPTION OF VAGINAL RING HORMONAL CONTRACEPTIVE: ICD-10-CM

## 2019-08-09 DIAGNOSIS — F41.9 ANXIETY AND DEPRESSION: ICD-10-CM

## 2019-08-09 RX ORDER — ESCITALOPRAM OXALATE 20 MG/1
20 TABLET ORAL DAILY
Qty: 90 TABLET | Refills: 3 | Status: SHIPPED | OUTPATIENT
Start: 2019-08-09 | End: 2021-01-26

## 2019-08-09 RX ORDER — ETONOGESTREL AND ETHINYL ESTRADIOL 11.7; 2.7 MG/1; MG/1
INSERT, EXTENDED RELEASE VAGINAL
Qty: 3 EACH | Refills: 3 | Status: SHIPPED | OUTPATIENT
Start: 2019-08-09 | End: 2021-01-26

## 2019-09-08 DIAGNOSIS — B36.0 TINEA VERSICOLOR: ICD-10-CM

## 2019-09-08 DIAGNOSIS — Z30.015 ENCOUNTER FOR INITIAL PRESCRIPTION OF VAGINAL RING HORMONAL CONTRACEPTIVE: ICD-10-CM

## 2019-09-08 RX ORDER — ETONOGESTREL AND ETHINYL ESTRADIOL 11.7; 2.7 MG/1; MG/1
INSERT, EXTENDED RELEASE VAGINAL
Qty: 3 EACH | Refills: 0 | Status: CANCELLED | OUTPATIENT
Start: 2019-09-08

## 2019-09-09 RX ORDER — SELENIUM SULFIDE 22.5 MG/ML
SHAMPOO TOPICAL
Qty: 180 ML | Refills: 0 | Status: SHIPPED | OUTPATIENT
Start: 2019-09-09 | End: 2020-02-03 | Stop reason: ALTCHOICE

## 2019-09-23 ENCOUNTER — TELEPHONE (OUTPATIENT)
Dept: FAMILY MEDICINE CLINIC | Facility: CLINIC | Age: 24
End: 2019-09-23

## 2019-09-23 NOTE — TELEPHONE ENCOUNTER
Patient aware of message no further action  Vivian [facility], [place]

## 2019-10-18 NOTE — PROGRESS NOTES
[address] [facility]  PATIENT:  Lizzy Luevano  MRN:  [id number]  :  1995  DATE OF SERVICE:  10/25/2019      Assessment/Plan:        Problem List Items Addressed This Visit        Cardiovascular and Mediastinum    Headache, chronic migraine without aura - Primary    Relevant Medications    tiZANidine (ZANAFLEX) 2 mg tablet    topiramate (TOPAMAX) 25 mg tablet    magnesium oxide (MAG-OX) 400 mg    Riboflavin (VITAMIN B-2) 100 MG TABS    Other Relevant Orders    Vitamin B12    TSH, 3rd generation with Free T4 reflex    CBC and differential    Comprehensive metabolic panel    ECG 12 lead       Nervous and Auditory    Chronic tension-type headache, intractable    Relevant Medications    tiZANidine (ZANAFLEX) 2 mg tablet    topiramate (TOPAMAX) 25 mg tablet       Other    Anxiety and depression    Relevant Orders    Vitamin B12    TSH, 3rd generation with Free T4 reflex    Cervicalgia    Relevant Medications    tiZANidine (ZANAFLEX) 2 mg tablet    Other Relevant Orders    XR spine cervical 2 or 3 vw injury    Ambulatory referral to Physical Therapy    Lumbago    Relevant Medications    tiZANidine (ZANAFLEX) 2 mg tablet    Other Relevant Orders    XR spine lumbar minimum 4 views non injury    Ambulatory referral to Physical Therapy    RESOLVED: Chronic headaches    Relevant Medications    tiZANidine (ZANAFLEX) 2 mg tablet    topiramate (TOPAMAX) 25 mg tablet    Other Relevant Orders    Vitamin D 25 hydroxy      Other Visit Diagnoses     Vitamin D deficiency                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     Preventive therapy for headaches:   -Over-the-counter supplements: to decrease intensity and frequency of migraines  - Magnesium Oxide 400mg a day    If any diarrhea or upset stomach, decrease dose  as tolerated  - Vitamin B2 200 mg twice a day  May cause the urine to turn yellow which is normal for B 2 to do and is not a sign that you are dehydrated  - tizanidine 2 mg half a tab at bedtime for 5 days then 1 tab after that  If patient is fatigued with this in the morning patient is just take half a tab at bedtime daily  -topiramate start 5 days after starting tizanidine  Start with 25 mg half a tab at bedtime for 1 week then 1 tab at bedtime for 1 week then 2 tabs at bedtime after that  - (reviewed side effects with the patient  Numbness and tingling patient is to increase potassium intake  Prevent kidney stone by drinking about 64 oz of non caffeinated fluids  Helps with weight loss and is a FDA approved weight loss drug  Informed patient that it may cause word-finding difficulty, if that is a problem patient is to call so that we may stop the drug)  Abortive therapy for headaches:   -at the onset of her moderate to severe headache patient may take Aleve 1-2 tabs as needed less than 3 times a week  [place] Prescription Drug Monitoring Program report was reviewed and was appropriate     Reproductive age women: Should take folic acid daily when taking anti-seizure drugs especially Depakote  Work up:   - lab:  B12, vitamin-D, TSH, CBC, CMP  - x-ray of cervical and lumbar spine    Headache management instructions  - When patient has a moderate to severe headache, they should seek rest, initiate relaxation and apply cold compresses to the head  - Maintain regular sleep schedule  Adults need at least 7-8 hours of uninterrupted a night  - Limit over the counter medications such as Tylenol, Ibuprofen, Aleve, Excedrin  (No more than 2- 3 times a week or max 10 a month)  - Maintain headache diary  Free KYUNG for a smart phone, which can be used is "Migraine buddy"  - Limit caffeine to 1-2 cups 8 to 16 oz a day or less    - Avoid dietary trigger  (aged cheese, peanuts, MSG, aspartame and nitrates)  - Patient is to have regular frequent meals to prevent headache onset  - Please drink at least 64 ounces of water a day to help remain hydrated  Neck pain:   - Neck pathology and poor posture, with straightening of the normal cervical lordosis, can cause headaches  Tightening of the neck muscles can irritate the nerves in the occipital region of the head and cause or worsen head pain  Thus neck strengthening and relaxation exercises, can help improve this particular pain  It is importance to have good posture for improving shoulder, neck, and head pain  - Here are some exercises which should take 5 minutes:     1  Standing, drop your head to one side while continuing to look ahead  Hold for 10 seconds then swap sides  Repeat twice more each side  To increase the stretch, drop the opposite shoulder  2  Standing again, lower your chin to your chest, hold for 10 and then look up to the ceiling and hold for 10  Repeat twice more  3  Next, standing straight again, look over your right shoulder and hold firm for 10 seconds, then over your left shoulder for 10  Repeat this 3 times  4  Finally, while sitting upright, bring your head forward and hold for 10, then all the way back and hold for 10  If this simple exercise does not help improve the posture, we will consider formal physical therapy in the future  Medication overuse headaches:   - Medication overuse headache [facility]) and analgesic overuse can negate the effectiveness of headache preventive measures  Avoid medications with narcotics, barbiturates, or caffeine in them as these can cause rebound headaches after very few doses and can interfere with other headache medicine efficacy  Taking  any acute/abortive over the counter medication or prescription drugs for more than 2-3 days a week can cause medication overuse headache        Importance of Healthy Sleep:  Behavioral sleep changes can promote restful, regular sleep and reduce headache  Simple changes like establishing consistent sleep and wake-up times, as well as getting between 7 and 8 hours of sleep a day, can make a world of difference  Experts also recommend avoiding substances that impair sleep, like caffeine, nicotine and alcohol, and also suggest winding down before bed to prevent sleep problems  To read more go to [email or web address]  org/resource-library/sleep/    Exercising for migraineurs:  Regular exercise can reduce the frequency and intensity of headaches and migraines  When one exercises, the body releases endorphins, which are the bodys natural painkillers  Exercise reduces stress and helps individuals to sleep at night  Exercising at least 30 to 40 minutes 3 times a week is sufficient for most patients  When exercising, follow this plan to prevent headaches:  - First, stay hydrated before, during, and after exercise  - Second part of the exercise plan is to eat sufficient food about an hour and a half before you exercise  Exercise causes ones blood sugar level to decrease, and it is important to have a source of energy    - Final part of the exercise plan is to warm-up  Do not jump into sudden, vigorous exercise if that triggers a headache or migraine  To read more go to [email or web address]  org/resource-library/effects-of-exercise-on-headaches-and-migraines/     Please call with any questions or concerns  Office number is [phone]         History of Present Illness: We had the pleasure of evaluating Prakash Banda in neurological consultation today for headaches  As you know,  she is a 22 y o  right handed  female  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 none  Any family history of aneurysms? no    Have you seen someone else for headaches or pain? no    Headaches started at what age? Middle school and she did have head injuries at that time as well  What is your current pain level? 6/10    How often do the headaches occur? Daily pain since she was 26years of age  Mild headaches: daily  Moderate to severe headaches: every other day or daily depending on what kind of work she is doing  She does a lot of physical work  Are you ever headache free? no    Aura/Warning and how long does it last? none    What time of the day do the headaches start? Mild headaches: there all the time   Moderate to severe headaches: mid day    How long do the headaches last?   Mild headaches: there all the time  Moderate to severe headaches: 1-2 hours    Describe your usual headache? Mild headaches: achy, dull, pressure  Moderate to severe headaches: throbbing, stabbing    Where is your headache located? Mild headaches: orbital and back of her head  Moderate to severe headaches: temporal and frontal region    What is the intensity of pain?    Mild headaches: 5/10  Moderate to severe headaches: 8-9/10    Associated symptoms:   - Nausea        - Photophobia      - Insomnia  - Stiff or sore neck   - Dizziness   light headed  - Problems with concentration  - Blurred vision     -  Prefer to be in a cool, quiet, dark room    Number of days missed per month because of headaches:  Work (or school) days:  none  Social or Family activities: none    Headache are worse if the patient: cough, sneeze, bending over, exertion  Headache triggers:  Stress, weather  What time of the year do headaches occur more frequently? do not seem to be related to any time of day or year    Have you had trigger point injection performed and how often? No  Have you had Botox injection performed and how often? No   Have you had epidural injections or transforaminal injections performed? No    Alternative therapies used in the past for headaches? Chiropractor  Have you used CBD or THC for your headaches and how often? No  How many caffeine products to drink a day? Tea sometimes  How much water to drink a day? 16 oz bottle 6 a day    Are you current pregnant or planning on getting pregnant? No    What medications do you take or have you taken for your headaches? Preventive therapy:   - magnesium and B2  - Lexapro 20 mg  - tizanidine  - topiramate  Abortive Therapy:   - over-the-counter medication  -   -              Neck pain:  When did the neck pain start? In middle school  What is the intensity of your pain? 8 to 10/10  Where is the pain located? Upper back and middle of her neck  Have you noticed decreased range of movement in your neck? No  Does your neck pain cause you to have headaches? yes  At what time of the day is your neck pain:  - Worst: exertion  - Best: when she wakes up  Is your neck pain associated with: off balance  What aggravates neck pain? lifting, computer work, using phone, chin to chest, looking at ceiling, turning head to left or right  What alleviates neck pain? hot packs,  stretching     Mood:   Depression: Yes  Anxiety: Yes      Have you ever had any Brain imaging? No    - Reviewed old notes from physician seen in the past  - Reviewed images on Portal   Recent laboratory data was reviewed  Medications and allergies were reviewed  Past Medical History:   Diagnosis Date    Acute post-traumatic stress disorder     Cancer ([facility] [address] )     Breast     Concussion     resolved 12/20/2016       Patient Active Problem List   Diagnosis    Arthralgia    Exercise-induced bronchospasm    Memory loss    Anxiety and depression    Obesity (BMI 30-39  9)    Headache, chronic migraine without aura    Chronic tension-type headache, intractable    Cervicalgia    Lumbago       Medications:      Current Outpatient Medications   Medication Sig Dispense Refill    albuterol (PROVENTIL HFA,VENTOLIN HFA) 90 mcg/act inhaler INHALE 2 PUFFS BY MOUTH ONCE FOR 1 DOSE 15 TO 30 MINUTES PRIOR TO EXERCISE  0    escitalopram (LEXAPRO) 20 mg tablet Take 1 tablet (20 mg total) by mouth daily 90 tablet 3    fluticasone (FLONASE) 50 mcg/act nasal spray 2 sprays into each nostril daily (Patient taking differently: 2 sprays into each nostril as needed ) 16 g 0    Selenium Sulfide 2 25 % SHAM Use daily for 7 days  Lather and rinse after 10 minutes 180 mL 0    etonogestrel-ethinyl estradiol (NUVARING) 0 12-0 015 MG/24HR vaginal ring Insert vaginally and leave in place for 3 consecutive weeks, then remove for 1 week  (Patient not taking: Reported on 10/21/2019) 3 each 3    magnesium oxide (MAG-OX) 400 mg 1 in a m  Daily 30 tablet 3    Riboflavin (VITAMIN B-2) 100 MG TABS 2 in the morning and 2 at bedtime 120 tablet 3    tiZANidine (ZANAFLEX) 2 mg tablet Half a tab at bedtime for 5 days then 1 tab after that 30 tablet 1    topiramate (TOPAMAX) 25 mg tablet Half a tablet for 1 week then 1 tablet for 1 week then 2 tabs at bedtime after that 60 tablet 2     No current facility-administered medications for this visit           Allergies:    No Known Allergies    Family History:     Family History   Problem Relation Age of Onset    Allergic rhinitis Father     Breast cancer Maternal Grandmother     Other Paternal Grandmother         Surgery Complication     Alcohol abuse Paternal Grandfather        Social History:     Social History     Socioeconomic History    Marital status: Single     Spouse name: Not on file    Number of children: Not on file    Years of education: Not on file    Highest education level: Not on file   Occupational History    Not on file   Social Needs    Financial resource strain: Not on file    Food insecurity:     Worry: Not on file     Inability: Not on file    Transportation needs:     Medical: Not on file     Non-medical: Not on file   Tobacco Use    Smoking status: Never Smoker    Smokeless tobacco: Never Used   Substance and Sexual Activity    Alcohol use: Yes     Comment: Socially     Drug use: No    Sexual activity: Yes     Partners: Male     Birth control/protection: Condom Male   Lifestyle    Physical activity:     Days per week: Not on file     Minutes per session: Not on file    Stress: Not on file   Relationships    Social connections:     Talks on phone: Not on file     Gets together: Not on file     Attends Zoroastrian service: Not on file     Active member of club or organization: Not on file     Attends meetings of clubs or organizations: Not on file     Relationship status: Not on file    Intimate partner violence:     Fear of current or ex partner: Not on file     Emotionally abused: Not on file     Physically abused: Not on file     Forced sexual activity: Not on file   Other Topics Concern    Not on file   Social History Narrative    Not on file         Objective:   Physical Exam:                                                                   Vitals:               /74 (BP Location: Left arm, Patient Position: Sitting, Cuff Size: Standard)   Pulse 76   Ht 5' 5" (1 651 m)   Wt 89 8 kg (198 lb)   BMI 32 95 kg/m²   BP Readings from Last 3 Encounters:   10/21/19 108/74   07/23/19 120/80   07/09/19 126/82     Pulse Readings from Last 3 Encounters:   10/21/19 76   07/23/19 72   07/09/19 84                CONSTITUTIONAL: Well developed, well nourished, well groomed  No dysmorphic features  Eyes:  PERRLA, EOM normal      Neck:  Normal ROM, neck supple  HEENT:  Normocephalic atraumatic  No meningismus  Oropharynx is clear and moist  No oral mucosal lesions  Chest:  Respirations regular and unlabored      Cardiovascular:  Distal extremities warm without palpable edema or tenderness, no observed significant swelling  Musculoskeletal:  Full range of motion  Skin:  warm and dry   Psychiatric:  Normal behavior and appropriate affect        Neurological Examination:   Mental status/cognitive function: Orientated to time, place and person  Cranial Nerves: 2 to 12 intact  Motor Exam:    5/5 upper extremity  5/5 lower extremity  Sensory: grossly intact light touch in all extremities  Reflexes:   2/4 upper extremity  2/4 lower extremity  No clonus noted  Coordination: Finger to nose intact bilaterally, no tremor noted  Gait: steady casual  gait, able to do tandem gait, romberg negative  Review of Systems:   Review of Systems   Constitutional: Positive for fatigue  HENT: Negative  Eyes: Positive for photophobia  Respiratory: Negative  Cardiovascular: Negative  Gastrointestinal: Positive for abdominal pain and diarrhea  Endocrine:        Hair Loss   Genitourinary: Positive for frequency  Musculoskeletal: Positive for back pain, myalgias and neck pain  Joint Pain    Skin: Negative  Allergic/Immunologic: Negative  Neurological: Positive for light-headedness and headaches  Balance Problems   Clumsiness  Memory Problems    Hematological: Bruises/bleeds easily  Psychiatric/Behavioral: Positive for decreased concentration and sleep disturbance (Trouble falling asleep )  Mood Swings          I personally reviewed and updated the ROS that was entered by the medical assistant       I have spent 60 minutes with Patient  today in which greater than 50% of this time was spent in counseling/coordination of care regarding Prognosis, Risks and benefits of tx options, Intructions for management, Patient and family education, Importance of tx compliance, Risk factor reductions, Impressions and Plan of care as above        Author:  Judith Butler MD 10/25/2019 9:20 AM

## 2019-10-21 ENCOUNTER — CONSULT (OUTPATIENT)
Dept: NEUROLOGY | Facility: CLINIC | Age: 24
End: 2019-10-21
Payer: COMMERCIAL

## 2019-10-21 VITALS
SYSTOLIC BLOOD PRESSURE: 108 MMHG | DIASTOLIC BLOOD PRESSURE: 74 MMHG | BODY MASS INDEX: 32.99 KG/M2 | HEIGHT: 65 IN | HEART RATE: 76 BPM | WEIGHT: 198 LBS

## 2019-10-21 DIAGNOSIS — G44.229 CHRONIC TENSION-TYPE HEADACHE, NOT INTRACTABLE: ICD-10-CM

## 2019-10-21 DIAGNOSIS — F32.A ANXIETY AND DEPRESSION: ICD-10-CM

## 2019-10-21 DIAGNOSIS — G44.221 CHRONIC TENSION-TYPE HEADACHE, INTRACTABLE: ICD-10-CM

## 2019-10-21 DIAGNOSIS — M54.2 CERVICALGIA: ICD-10-CM

## 2019-10-21 DIAGNOSIS — M54.50 CHRONIC LOW BACK PAIN WITHOUT SCIATICA, UNSPECIFIED BACK PAIN LATERALITY: ICD-10-CM

## 2019-10-21 DIAGNOSIS — G43.709 CHRONIC MIGRAINE WITHOUT AURA WITHOUT STATUS MIGRAINOSUS, NOT INTRACTABLE: Primary | ICD-10-CM

## 2019-10-21 DIAGNOSIS — F41.9 ANXIETY AND DEPRESSION: ICD-10-CM

## 2019-10-21 DIAGNOSIS — G89.29 CHRONIC LOW BACK PAIN WITHOUT SCIATICA, UNSPECIFIED BACK PAIN LATERALITY: ICD-10-CM

## 2019-10-21 DIAGNOSIS — E55.9 VITAMIN D DEFICIENCY: ICD-10-CM

## 2019-10-21 PROBLEM — R51.9 CHRONIC HEADACHES: Status: RESOLVED | Noted: 2019-07-23 | Resolved: 2019-10-21

## 2019-10-21 PROCEDURE — 99205 OFFICE O/P NEW HI 60 MIN: CPT | Performed by: PSYCHIATRY & NEUROLOGY

## 2019-10-21 RX ORDER — TIZANIDINE 2 MG/1
TABLET ORAL
Qty: 30 TABLET | Refills: 1 | Status: SHIPPED | OUTPATIENT
Start: 2019-10-21 | End: 2020-02-03 | Stop reason: SDUPTHER

## 2019-10-21 RX ORDER — TOPIRAMATE 25 MG/1
TABLET ORAL
Qty: 60 TABLET | Refills: 2 | Status: SHIPPED | OUTPATIENT
Start: 2019-10-21 | End: 2020-02-03 | Stop reason: ALTCHOICE

## 2019-10-21 NOTE — PATIENT INSTRUCTIONS
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     Preventive therapy for headaches:   -Over-the-counter supplements: to decrease intensity and frequency of migraines  - Magnesium Oxide 400mg a day  If any diarrhea or upset stomach, decrease dose  as tolerated  - Vitamin B2 200 mg twice a day  May cause the urine to turn yellow which is normal for B 2 to do and is not a sign that you are dehydrated  - tizanidine 2 mg half a tab at bedtime for 5 days then 1 tab after that  If patient is fatigued with this in the morning patient is just take half a tab at bedtime daily  -topiramate start 5 days after starting tizanidine  Start with 25 mg half a tab at bedtime for 1 week then 1 tab at bedtime for 1 week then 2 tabs at bedtime after that  - (reviewed side effects with the patient  Numbness and tingling patient is to increase potassium intake  Prevent kidney stone by drinking about 64 oz of non caffeinated fluids  Helps with weight loss and is a FDA approved weight loss drug  Informed patient that it may cause word-finding difficulty, if that is a problem patient is to call so that we may stop the drug)  Abortive therapy for headaches:   -at the onset of her moderate to severe headache patient may take Aleve 1-2 tabs as needed less than 3 times a week  [place] Prescription Drug Monitoring Program report was reviewed and was appropriate     Reproductive age women: Should take folic acid daily when taking anti-seizure drugs especially Depakote      Work up:   - lab:  B12, vitamin-D, TSH, CBC, CMP  - x-ray of cervical and lumbar spine    Headache management instructions  - When patient has a moderate to severe headache, they should seek rest, initiate relaxation and apply cold compresses to the head  - Maintain regular sleep schedule  Adults need at least 7-8 hours of uninterrupted a night  - Limit over the counter medications such as Tylenol, Ibuprofen, Aleve, Excedrin  (No more than 2- 3 times a week or max 10 a month)  - Maintain headache diary  Free KYUNG for a smart phone, which can be used is "Migraine bruce"  - Limit caffeine to 1-2 cups 8 to 16 oz a day or less  - Avoid dietary trigger  (aged cheese, peanuts, MSG, aspartame and nitrates)  - Patient is to have regular frequent meals to prevent headache onset  - Please drink at least 64 ounces of water a day to help remain hydrated  Neck pain:   - Neck pathology and poor posture, with straightening of the normal cervical lordosis, can cause headaches  Tightening of the neck muscles can irritate the nerves in the occipital region of the head and cause or worsen head pain  Thus neck strengthening and relaxation exercises, can help improve this particular pain  It is importance to have good posture for improving shoulder, neck, and head pain  - Here are some exercises which should take 5 minutes:     1  Standing, drop your head to one side while continuing to look ahead  Hold for 10 seconds then swap sides  Repeat twice more each side  To increase the stretch, drop the opposite shoulder  2  Standing again, lower your chin to your chest, hold for 10 and then look up to the ceiling and hold for 10  Repeat twice more  3  Next, standing straight again, look over your right shoulder and hold firm for 10 seconds, then over your left shoulder for 10  Repeat this 3 times  4  Finally, while sitting upright, bring your head forward and hold for 10, then all the way back and hold for 10  If this simple exercise does not help improve the posture, we will consider formal physical therapy in the future       Medication overuse headaches:   - Medication overuse headache [facility]) and analgesic overuse can negate the effectiveness of headache preventive measures  Avoid medications with narcotics, barbiturates, or caffeine in them as these can cause rebound headaches after very few doses and can interfere with other headache medicine efficacy  Taking  any acute/abortive over the counter medication or prescription drugs for more than 2-3 days a week can cause medication overuse headache  Importance of Healthy Sleep:  Behavioral sleep changes can promote restful, regular sleep and reduce headache  Simple changes like establishing consistent sleep and wake-up times, as well as getting between 7 and 8 hours of sleep a day, can make a world of difference  Experts also recommend avoiding substances that impair sleep, like caffeine, nicotine and alcohol, and also suggest winding down before bed to prevent sleep problems  To read more go to [email or web address]  org/resource-library/sleep/    Exercising for migraineurs:  Regular exercise can reduce the frequency and intensity of headaches and migraines  When one exercises, the body releases endorphins, which are the bodys natural painkillers  Exercise reduces stress and helps individuals to sleep at night  Exercising at least 30 to 40 minutes 3 times a week is sufficient for most patients  When exercising, follow this plan to prevent headaches:  - First, stay hydrated before, during, and after exercise  - Second part of the exercise plan is to eat sufficient food about an hour and a half before you exercise  Exercise causes ones blood sugar level to decrease, and it is important to have a source of energy    - Final part of the exercise plan is to warm-up  Do not jump into sudden, vigorous exercise if that triggers a headache or migraine  To read more go to [email or web address]  org/resource-library/effects-of-exercise-on-headaches-and-migraines/     Please call with any questions or concerns   Office number is [phone]

## 2019-10-24 ENCOUNTER — TELEPHONE (OUTPATIENT)
Dept: NEUROLOGY | Facility: CLINIC | Age: 24
End: 2019-10-24

## 2019-10-24 NOTE — TELEPHONE ENCOUNTER
Patient stated she wasn't sure how to take her meds (tizainidne and topiramate)  Reviewed Dr Kirstin Lofton office note with the patient  Informed her to start with tizanidine for 5 days and increase as tolerated and then start topiramate 5 days after starting tizanidine  Reviewed possible SE and advised to increase potassium intake and fluids    Advised she contact us if would have any intolerable SE   Patient verbalized understanding

## 2019-12-18 ENCOUNTER — TRANSCRIBE ORDERS (OUTPATIENT)
Dept: ADMINISTRATIVE | Facility: HOSPITAL | Age: 24
End: 2019-12-18

## 2019-12-18 ENCOUNTER — HOSPITAL ENCOUNTER (OUTPATIENT)
Dept: RADIOLOGY | Facility: HOSPITAL | Age: 24
Discharge: HOME | End: 2019-12-18
Attending: PSYCHIATRY & NEUROLOGY
Payer: COMMERCIAL

## 2019-12-18 ENCOUNTER — APPOINTMENT (OUTPATIENT)
Dept: LAB | Facility: HOSPITAL | Age: 24
End: 2019-12-18
Attending: PSYCHIATRY & NEUROLOGY
Payer: COMMERCIAL

## 2019-12-18 DIAGNOSIS — G43.709 CHRONIC MIGRAINE WITHOUT AURA WITHOUT STATUS MIGRAINOSUS, NOT INTRACTABLE: ICD-10-CM

## 2019-12-18 DIAGNOSIS — F32.A ANXIETY AND DEPRESSION: ICD-10-CM

## 2019-12-18 DIAGNOSIS — G44.229 CHRONIC TENSION-TYPE HEADACHE, NOT INTRACTABLE: ICD-10-CM

## 2019-12-18 DIAGNOSIS — G89.29 CHRONIC LOW BACK PAIN WITHOUT SCIATICA, UNSPECIFIED BACK PAIN LATERALITY: ICD-10-CM

## 2019-12-18 DIAGNOSIS — M54.50 CHRONIC LOW BACK PAIN WITHOUT SCIATICA, UNSPECIFIED BACK PAIN LATERALITY: ICD-10-CM

## 2019-12-18 DIAGNOSIS — M54.2 CERVICALGIA: ICD-10-CM

## 2019-12-18 DIAGNOSIS — F41.9 ANXIETY AND DEPRESSION: ICD-10-CM

## 2019-12-18 LAB
25(OH)D3 SERPL-MCNC: 32.2 NG/ML (ref 30–100)
ALBUMIN SERPL BCP-MCNC: 3.8 G/DL (ref 3.5–5)
ALP SERPL-CCNC: 61 U/L (ref 46–116)
ALT SERPL W P-5'-P-CCNC: 42 U/L (ref 12–78)
ANION GAP SERPL CALCULATED.3IONS-SCNC: 6 MMOL/L (ref 4–13)
AST SERPL W P-5'-P-CCNC: 28 U/L (ref 5–45)
BASOPHILS # BLD AUTO: 0.04 THOUSANDS/ΜL (ref 0–0.1)
BASOPHILS NFR BLD AUTO: 1 % (ref 0–1)
BILIRUB SERPL-MCNC: 0.6 MG/DL (ref 0.2–1)
BUN SERPL-MCNC: 14 MG/DL (ref 5–25)
CALCIUM SERPL-MCNC: 9.1 MG/DL (ref 8.3–10.1)
CHLORIDE SERPL-SCNC: 105 MMOL/L (ref 100–108)
CO2 SERPL-SCNC: 28 MMOL/L (ref 21–32)
CREAT SERPL-MCNC: 0.68 MG/DL (ref 0.6–1.3)
EOSINOPHIL # BLD AUTO: 0.06 THOUSAND/ΜL (ref 0–0.61)
EOSINOPHIL NFR BLD AUTO: 1 % (ref 0–6)
ERYTHROCYTE [DISTWIDTH] IN BLOOD BY AUTOMATED COUNT: 12 % (ref 11.6–15.1)
GFR SERPL CREATININE-BSD FRML MDRD: 124 ML/MIN/1.73SQ M
GLUCOSE SERPL-MCNC: 123 MG/DL (ref 65–140)
HCT VFR BLD AUTO: 43.5 % (ref 34.8–46.1)
HGB BLD-MCNC: 14.2 G/DL (ref 11.5–15.4)
IMM GRANULOCYTES # BLD AUTO: 0.02 THOUSAND/UL (ref 0–0.2)
IMM GRANULOCYTES NFR BLD AUTO: 0 % (ref 0–2)
LYMPHOCYTES # BLD AUTO: 1.83 THOUSANDS/ΜL (ref 0.6–4.47)
LYMPHOCYTES NFR BLD AUTO: 27 % (ref 14–44)
MCH RBC QN AUTO: 28.5 PG (ref 26.8–34.3)
MCHC RBC AUTO-ENTMCNC: 32.6 G/DL (ref 31.4–37.4)
MCV RBC AUTO: 87 FL (ref 82–98)
MONOCYTES # BLD AUTO: 0.39 THOUSAND/ΜL (ref 0.17–1.22)
MONOCYTES NFR BLD AUTO: 6 % (ref 4–12)
NEUTROPHILS # BLD AUTO: 4.47 THOUSANDS/ΜL (ref 1.85–7.62)
NEUTS SEG NFR BLD AUTO: 65 % (ref 43–75)
NRBC BLD AUTO-RTO: 0 /100 WBCS
PLATELET # BLD AUTO: 212 THOUSANDS/UL (ref 149–390)
PMV BLD AUTO: 10.7 FL (ref 8.9–12.7)
POTASSIUM SERPL-SCNC: 3.8 MMOL/L (ref 3.5–5.3)
PROT SERPL-MCNC: 7 G/DL (ref 6.4–8.2)
RBC # BLD AUTO: 4.99 MILLION/UL (ref 3.81–5.12)
SODIUM SERPL-SCNC: 139 MMOL/L (ref 136–145)
TSH SERPL DL<=0.05 MIU/L-ACNC: 1 UIU/ML (ref 0.36–3.74)
VIT B12 SERPL-MCNC: 385 PG/ML (ref 100–900)
WBC # BLD AUTO: 6.81 THOUSAND/UL (ref 4.31–10.16)

## 2019-12-18 PROCEDURE — 85025 COMPLETE CBC W/AUTO DIFF WBC: CPT

## 2019-12-18 PROCEDURE — 72110 X-RAY EXAM L-2 SPINE 4/>VWS: CPT

## 2019-12-18 PROCEDURE — 82306 VITAMIN D 25 HYDROXY: CPT

## 2019-12-18 PROCEDURE — 80053 COMPREHEN METABOLIC PANEL: CPT

## 2019-12-18 PROCEDURE — 72040 X-RAY EXAM NECK SPINE 2-3 VW: CPT

## 2019-12-18 PROCEDURE — 93005 ELECTROCARDIOGRAM TRACING: CPT

## 2019-12-18 PROCEDURE — 84443 ASSAY THYROID STIM HORMONE: CPT

## 2019-12-18 PROCEDURE — 82607 VITAMIN B-12: CPT

## 2019-12-18 PROCEDURE — 36415 COLL VENOUS BLD VENIPUNCTURE: CPT

## 2019-12-19 ENCOUNTER — TELEPHONE (OUTPATIENT)
Dept: NEUROLOGY | Facility: CLINIC | Age: 24
End: 2019-12-19

## 2019-12-19 LAB
ATRIAL RATE: 71 BPM
P AXIS: 43 DEGREES
PR INTERVAL: 124 MS
QRS AXIS: 26 DEGREES
QRSD INTERVAL: 84 MS
QT INTERVAL: 384 MS
QTC INTERVAL: 417 MS
T WAVE AXIS: 33 DEGREES
VENTRICULAR RATE: 71 BPM

## 2019-12-19 PROCEDURE — 93010 ELECTROCARDIOGRAM REPORT: CPT | Performed by: INTERNAL MEDICINE

## 2019-12-19 NOTE — TELEPHONE ENCOUNTER
Patient returned my phone call  I let the patient know about her lab results, instructed her on which OTC B12 to  and how to take it  Patient verbalized clear understanding  No other questions or concerns at this time

## 2019-12-19 NOTE — TELEPHONE ENCOUNTER
----- Message from Leone Closs, RN sent at 12/19/2019 10:18 AM EST -----      ----- Message -----  From: Gadiel Jordan PA-C  Sent: 12/19/2019   9:45 AM EST  To: [facility]    Please call the patient to have her take sublingual b12 500-1000mcg daily

## 2020-02-03 ENCOUNTER — TELEPHONE (OUTPATIENT)
Dept: NEUROLOGY | Facility: CLINIC | Age: 25
End: 2020-02-03

## 2020-02-03 ENCOUNTER — OFFICE VISIT (OUTPATIENT)
Dept: NEUROLOGY | Facility: CLINIC | Age: 25
End: 2020-02-03
Payer: COMMERCIAL

## 2020-02-03 VITALS
SYSTOLIC BLOOD PRESSURE: 122 MMHG | HEIGHT: 65 IN | HEART RATE: 78 BPM | BODY MASS INDEX: 34.32 KG/M2 | DIASTOLIC BLOOD PRESSURE: 76 MMHG | WEIGHT: 206 LBS

## 2020-02-03 DIAGNOSIS — M54.50 CHRONIC LOW BACK PAIN WITHOUT SCIATICA, UNSPECIFIED BACK PAIN LATERALITY: ICD-10-CM

## 2020-02-03 DIAGNOSIS — G24.3 CERVICAL DYSTONIA: ICD-10-CM

## 2020-02-03 DIAGNOSIS — F41.9 ANXIETY AND DEPRESSION: ICD-10-CM

## 2020-02-03 DIAGNOSIS — G43.701 CHRONIC MIGRAINE WITHOUT AURA WITH STATUS MIGRAINOSUS, NOT INTRACTABLE: Primary | ICD-10-CM

## 2020-02-03 DIAGNOSIS — G89.29 CHRONIC LOW BACK PAIN WITHOUT SCIATICA, UNSPECIFIED BACK PAIN LATERALITY: ICD-10-CM

## 2020-02-03 DIAGNOSIS — F32.A ANXIETY AND DEPRESSION: ICD-10-CM

## 2020-02-03 DIAGNOSIS — M54.2 CERVICALGIA: ICD-10-CM

## 2020-02-03 DIAGNOSIS — G44.221 CHRONIC TENSION-TYPE HEADACHE, INTRACTABLE: ICD-10-CM

## 2020-02-03 PROCEDURE — 3008F BODY MASS INDEX DOCD: CPT | Performed by: PSYCHIATRY & NEUROLOGY

## 2020-02-03 PROCEDURE — 1036F TOBACCO NON-USER: CPT | Performed by: PSYCHIATRY & NEUROLOGY

## 2020-02-03 PROCEDURE — 99214 OFFICE O/P EST MOD 30 MIN: CPT | Performed by: PSYCHIATRY & NEUROLOGY

## 2020-02-03 RX ORDER — TOPIRAMATE 50 MG/1
TABLET, FILM COATED ORAL
Qty: 120 TABLET | Refills: 3 | Status: SHIPPED | OUTPATIENT
Start: 2020-02-03 | End: 2020-07-30 | Stop reason: SDUPTHER

## 2020-02-03 RX ORDER — TIZANIDINE 2 MG/1
TABLET ORAL
Qty: 60 TABLET | Refills: 3 | Status: SHIPPED | OUTPATIENT
Start: 2020-02-03 | End: 2021-01-26

## 2020-02-03 NOTE — PROGRESS NOTES
Review of Systems   Constitutional: Positive for fatigue  HENT: Negative  Eyes: Negative  Respiratory: Negative  Cardiovascular: Negative  Gastrointestinal: Positive for nausea  Endocrine: Negative  Genitourinary: Negative  Musculoskeletal: Positive for neck pain and neck stiffness  Skin: Negative  Allergic/Immunologic: Negative  Neurological: Positive for headaches  Hematological: Bruises/bleeds easily  Psychiatric/Behavioral: Positive for sleep disturbance (Trouble falling and staying asleep )

## 2020-02-03 NOTE — PROGRESS NOTES
[facility] [facility]  PATIENT:  Bassem Solis  MRN:  [id number]  :  1995  DATE OF SERVICE:  2/3/2020      Assessment/Plan:        Problem List Items Addressed This Visit        Cardiovascular and Mediastinum    Headache, chronic migraine without aura - Primary    Relevant Medications    tiZANidine (ZANAFLEX) 2 mg tablet    topiramate (TOPAMAX) 50 MG tablet    Other Relevant Orders    Ambulatory referral to Physical Therapy       Nervous and Auditory    Chronic tension-type headache, intractable    Relevant Medications    tiZANidine (ZANAFLEX) 2 mg tablet    topiramate (TOPAMAX) 50 MG tablet    Other Relevant Orders    Ambulatory referral to Physical Therapy    Cervical dystonia    Relevant Medications    tiZANidine (ZANAFLEX) 2 mg tablet    topiramate (TOPAMAX) 50 MG tablet    Other Relevant Orders    Ambulatory referral to Physical Therapy       Other    Anxiety and depression    Cervicalgia    Relevant Medications    tiZANidine (ZANAFLEX) 2 mg tablet    Other Relevant Orders    Ambulatory referral to Physical Therapy    Lumbago    Relevant Medications    tiZANidine (ZANAFLEX) 2 mg tablet    Other Relevant Orders    Ambulatory referral to Physical Therapy          Depression/anxiety:  Lexapro 20 mg once a day    Cervicalgia/cervical dystonia causing patient have chronic tension-type headaches and migraines  - tizanidine 2 mg half a tablet in a m   And 1 tablet at bedtime daily  - will refer patient to physical therapy for evaluation and treatment  Will apply for Botox    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    Chronic tension-type headaches intractable  Chronic migraine headaches without aura   Preventive therapy for headaches:   -Over-the-counter supplements: to decrease intensity and frequency of migraines  - Magnesium Oxide 400mg a day   If any diarrhea or upset stomach, decrease dose  as tolerated   -topiramate 50 mg in a m  and 150 mg at bedtime Abortive therapy for headaches:   -at the onset of her moderate to severe headache patient may take Aleve 1-2 tabs as needed less than 3 times a week        Headache management instructions  - When patient has a moderate to severe headache, they should seek rest, initiate relaxation and apply cold compresses to the head  - Maintain regular sleep schedule  Adults need at least 7-8 hours of uninterrupted a night  - Limit over the counter medications such as Tylenol, Ibuprofen, Aleve, Excedrin  (No more than 2- 3 times a week or max 10 a month)  - Maintain headache diary   Free KYUNG for a smart phone, which can be used is "Migraine buddy"  - Limit caffeine to 1-2 cups 8 to 16 oz a day or less  - Avoid dietary trigger  (aged cheese, peanuts, MSG, aspartame and nitrates)  - Patient is to have regular frequent meals to prevent headache onset     - Please drink at least 64 ounces of water a day to help remain hydrated         Please call with any questions or concerns  Office number is [phone]        History of Present Illness: We had the pleasure of evaluating Jagdeep Odell in neurological follow-up today for headaches  As you know,  she is a 22 y o  right handed  female  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 In middle school  What is the intensity of your pain?baseline pain is 6/10 and when severe 8 to 10/10  Where is the pain located?  Upper back and middle of her neck  Have you noticed decreased range of movement in your neck? yes  Does your neck pain cause you to have headaches? yes  At what time of the day is your neck pain:  - Worst: exertion  - Best: when she wakes up  Is your neck pain associated with: off balance  What aggravates neck pain? lifting, computer work, using phone, chin to chest, looking at ceiling, turning head to left or right  What alleviates neck pain? hot packs,  stretching      Chronic migraine headaches/chronic tension-type headaches:    What medications do you take or have you taken for your headaches? Preventive therapy:   - magnesium and B2  - Lexapro 20 mg  - tizanidine 2 mg  - topiramate 100 mg  Abortive Therapy:   - over-the-counter medication  -   -          What is your current pain level? 6/10     How often do the headaches occur? Daily pain since she was 26years of age  Mild headaches: daily  Moderate to severe headaches: every other day or daily depending on what kind of work she is doing  She does a lot of physical work       Are you ever headache free? no     Aura/Warning and how long does it last? none     What time of the day do the headaches start? Mild headaches: there all the time   Moderate to severe headaches: mid day     How long do the headaches last?   Mild headaches: there all the time  Moderate to severe headaches: 1-2 hours     Describe your usual headache? Mild headaches: achy, dull, pressure  Moderate to severe headaches: throbbing, stabbing     Where is your headache located? Mild headaches: orbital and back of her head  Moderate to severe headaches: temporal and frontal region     What is the intensity of pain?    Mild headaches: 5/10  Moderate to severe headaches: 8-9/10     Associated symptoms:   - Nausea        - Photophobia      - Insomnia  - Stiff or sore neck   - Dizziness   light headed  - Problems with concentration  - Blurred vision     -  Prefer to be in a cool, quiet, dark room     Number of days missed per month because of headaches:  Work (or school) days:  none  Social or Family activities: none     Headache are worse if the patient: cough, sneeze, bending over, exertion  Headache triggers:  Stress, weather  What time of the year do headaches occur more frequently? do not seem to be related to any time of day or year     Have you had trigger point injection performed and how often? No  Have you had Botox injection performed and how often? No   Have you had epidural injections or transforaminal injections performed? No     Alternative therapies used in the past for headaches? Chiropractor  Have you used CBD or THC for your headaches and how often? No  How many caffeine products to drink a day? Tea sometimes  How much water to drink a day? 16 oz bottle 6 a day     Are you current pregnant or planning on getting pregnant? No        Have you ever had any Brain imaging? No     - Reviewed old notes from physician seen in the past  - Reviewed images on Portal   Recent laboratory data was reviewed  Medications and allergies were reviewed      Past Medical History:   Diagnosis Date    Acute post-traumatic stress disorder     Cancer ([facility] [address] )     Breast     Concussion     resolved 12/20/2016       Patient Active Problem List   Diagnosis    Arthralgia    Exercise-induced bronchospasm    Memory loss    Anxiety and depression    Obesity (BMI 30-39  9)    Headache, chronic migraine without aura    Chronic tension-type headache, intractable    Cervicalgia    Lumbago    Cervical dystonia       Medications:      Current Outpatient Medications   Medication Sig Dispense Refill    albuterol (PROVENTIL HFA,VENTOLIN HFA) 90 mcg/act inhaler INHALE 2 PUFFS BY MOUTH ONCE FOR 1 DOSE 15 TO 30 MINUTES PRIOR TO EXERCISE  0    escitalopram (LEXAPRO) 20 mg tablet Take 1 tablet (20 mg total) by mouth daily (Patient taking differently: Take 20 mg by mouth as needed ) 90 tablet 3    fluticasone (FLONASE) 50 mcg/act nasal spray 2 sprays into each nostril daily (Patient taking differently: 2 sprays into each nostril as needed ) 16 g 0    magnesium oxide (MAG-OX) 400 mg 1 in a m  Daily 30 tablet 3    tiZANidine (ZANAFLEX) 2 mg tablet Half a tab in a m  And 1 tab at bedtime 60 tablet 3    etonogestrel-ethinyl estradiol (NUVARING) 0 12-0 015 MG/24HR vaginal ring Insert vaginally and leave in place for 3 consecutive weeks, then remove for 1 week  (Patient not taking: Reported on 10/21/2019) 3 each 3    Riboflavin (VITAMIN B-2) 100 MG TABS 2 in the morning and 2 at bedtime (Patient not taking: Reported on 2/3/2020) 120 tablet 3    topiramate (TOPAMAX) 50 MG tablet 1 in a m  and 3 at bedtime 120 tablet 3     No current facility-administered medications for this visit           Allergies:    No Known Allergies    Family History:     Family History   Problem Relation Age of Onset    Allergic rhinitis Father     Breast cancer Maternal Grandmother     Other Paternal Grandmother         Surgery Complication     Alcohol abuse Paternal Grandfather        Social History:     Social History     Socioeconomic History    Marital status: Single     Spouse name: Not on file    Number of children: Not on file    Years of education: Not on file    Highest education level: Not on file   Occupational History    Not on file   Social Needs    Financial resource strain: Not on file    Food insecurity:     Worry: Not on file     Inability: Not on file    Transportation needs:     Medical: Not on file     Non-medical: Not on file   Tobacco Use    Smoking status: Never Smoker    Smokeless tobacco: Never Used   Substance and Sexual Activity    Alcohol use: Yes     Comment: Socially     Drug use: No    Sexual activity: Yes     Partners: Male     Birth control/protection: Condom Male   Lifestyle    Physical activity:     Days per week: Not on file     Minutes per session: Not on file    Stress: Not on file   Relationships    Social connections:     Talks on phone: Not on file     Gets together: Not on file     Attends Taoist service: Not on file     Active member of club or organization: Not on file     Attends meetings of clubs or organizations: Not on file     Relationship status: Not on file    Intimate partner violence:     Fear of current or ex partner: Not on file     Emotionally abused: Not on file     Physically abused: Not on file     Forced sexual activity: Not on file   Other Topics Concern    Not on file   Social History Narrative    Not on file         Objective:   Physical Exam:                                                                   Vitals:            /76 (BP Location: Right arm, Patient Position: Sitting, Cuff Size: Standard)   Pulse 78   Ht 5' 5" (1 651 m)   Wt 93 4 kg (206 lb)   BMI 34 28 kg/m²   BP Readings from Last 3 Encounters:   02/03/20 122/76   10/21/19 108/74   07/23/19 120/80     Pulse Readings from Last 3 Encounters:   02/03/20 78   10/21/19 76   07/23/19 72            CONSTITUTIONAL: Well developed, well nourished, well groomed  No dysmorphic features  Eyes:  PERRLA, EOM normal      Neck:   difficulty rotating her neck, anterior pulsion of the neck     HEENT:  Normocephalic atraumatic  No meningismus  Oropharynx is clear and moist  No oral mucosal lesions  Chest:  Respirations regular and unlabored  Cardiovascular:  Distal extremities warm without palpable edema or tenderness, no observed significant swelling  Musculoskeletal:  Full range of motion  Skin:  warm and dry   Psychiatric:  Normal behavior and appropriate affect        Neurological Examination:   Mental status/cognitive function: Orientated to time, place and person       Cranial Nerves: 2 to 12 intact    Motor Exam:    5/5 in the right upper extremity  5/5 in the left upper extremity  5/5 in the right lower extremity  5/5 in the left lower extremity   No spasticity noted    Sensory:   Intact to light touch /pinprick in the upper extremity  Intact to light touch /pinprick in the lower extremity    Reflexes:   2/4  right upper extremity  2/4  left upper extremity  2/4 left lower extremity  2/4  right lower extremity  No clonus noted    Coordination: Finger to nose intact bilaterally, no tremor noted    Gait:   - Steady casual gait  - Able to do tandem gait without difficulty  - Negative Romberg               Review of Systems:   Review of Systems  Review of Systems   Constitutional: Positive for fatigue  HENT: Negative  Eyes: Negative  Respiratory: Negative  Cardiovascular: Negative  Gastrointestinal: Positive for nausea  Endocrine: Negative  Genitourinary: Negative  Musculoskeletal: Positive for neck pain and neck stiffness  Skin: Negative  Allergic/Immunologic: Negative  Neurological: Positive for headaches  Hematological: Bruises/bleeds easily  Psychiatric/Behavioral: Positive for sleep disturbance (Trouble falling and staying asleep )  I have spent 30 minutes with Patient  today in which greater than 50% of this time was spent in counseling/coordination of care regarding Diagnostic results, Prognosis, Risks and benefits of tx options, Intructions for management, Patient and family education, Importance of tx compliance, Risk factor reductions, Impressions and Plan of care as above           Author:  Vanessa Valentino MD 2/3/2020 1:00 PM

## 2020-02-03 NOTE — TELEPHONE ENCOUNTER
----- Message from Soheila Siddiqui MD sent at 2/3/2020 12:54 PM EST -----  Need Botox 200 units for her cervical dystonia    Thank you

## 2020-02-03 NOTE — PATIENT INSTRUCTIONS
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    Chronic tension-type headaches intractable  Chronic migraine headaches without aura   Preventive therapy for headaches:   -Over-the-counter supplements: to decrease intensity and frequency of migraines  - Magnesium Oxide 400mg a day   If any diarrhea or upset stomach, decrease dose  as tolerated   -topiramate 50 mg in a m  and 150 mg at bedtime   Abortive therapy for headaches:   -at the onset of her moderate to severe headache patient may take Aleve 1-2 tabs as needed less than 3 times a week        Headache management instructions  - When patient has a moderate to severe headache, they should seek rest, initiate relaxation and apply cold compresses to the head  - Maintain regular sleep schedule  Adults need at least 7-8 hours of uninterrupted a night  - Limit over the counter medications such as Tylenol, Ibuprofen, Aleve, Excedrin  (No more than 2- 3 times a week or max 10 a month)  - Maintain headache diary   Free KYUNG for a smart phone, which can be used is "Migraine buddy"  - Limit caffeine to 1-2 cups 8 to 16 oz a day or less  - Avoid dietary trigger  (aged cheese, peanuts, MSG, aspartame and nitrates)  - Patient is to have regular frequent meals to prevent headache onset     - Please drink at least 64 ounces of water a day to help remain hydrated         Please call with any questions or concerns   Office number is [phone]

## 2020-02-05 NOTE — TELEPHONE ENCOUNTER
Please schedule new start Botox at least 2 weeks out as this will be specialty pharmacy  Please let me know once the apt is scheduled so I can attach the referral     Approval letter received from [place]  Thank you!     Kenzie

## 2020-02-26 ENCOUNTER — OFFICE VISIT (OUTPATIENT)
Dept: URGENT CARE | Facility: CLINIC | Age: 25
End: 2020-02-26
Payer: COMMERCIAL

## 2020-02-26 VITALS
WEIGHT: 207 LBS | TEMPERATURE: 100.3 F | RESPIRATION RATE: 18 BRPM | HEART RATE: 85 BPM | BODY MASS INDEX: 33.27 KG/M2 | DIASTOLIC BLOOD PRESSURE: 74 MMHG | HEIGHT: 66 IN | SYSTOLIC BLOOD PRESSURE: 117 MMHG | OXYGEN SATURATION: 99 %

## 2020-02-26 DIAGNOSIS — B34.9 VIRAL SYNDROME: Primary | ICD-10-CM

## 2020-02-26 PROCEDURE — 1036F TOBACCO NON-USER: CPT | Performed by: PHYSICIAN ASSISTANT

## 2020-02-26 PROCEDURE — 3008F BODY MASS INDEX DOCD: CPT | Performed by: PHYSICIAN ASSISTANT

## 2020-02-26 PROCEDURE — 99213 OFFICE O/P EST LOW 20 MIN: CPT | Performed by: PHYSICIAN ASSISTANT

## 2020-02-26 NOTE — PATIENT INSTRUCTIONS
Begin an expectorant such as Mucinex  Nasal saline spray or Neti-pot to rinse the nasal passages  Use your inhaler as previously directed  Tylenol or Motrin may be taken for fever and discomfort  Check your package labeling as some cold medications already contain fever reducers  Saltwater gargles, warm tea with honey, and throat lozenges may be relieving of throat discomfort  Use a cool mist humidifier at bedtime, turning on hours prior to bed with your bedroom doors shut for maximum relief  Follow up with your family doctor in 3-5 days  Proceed to the ER if symptoms worsen

## 2020-02-26 NOTE — PROGRESS NOTES
[facility]        NAME: Ty Shukla is a 22 y o  female  : 1995    MRN: [id number]  DATE: 2020  TIME: 6:31 PM    Assessment and Plan   Viral syndrome [B34 9]  1  Viral syndrome       Patient Instructions   Begin an expectorant such as Mucinex  Nasal saline spray or Neti-pot to rinse the nasal passages  Use your inhaler as previously directed  Tylenol or Motrin may be taken for fever and discomfort  Check your package labeling as some cold medications already contain fever reducers  Saltwater gargles, warm tea with honey, and throat lozenges may be relieving of throat discomfort  Use a cool mist humidifier at bedtime, turning on hours prior to bed with your bedroom doors shut for maximum relief  Follow up with your family doctor in 3-5 days  Proceed to the ER if symptoms worsen  The diagnosis, viral etiology, expected course of illness, and treatment plan were reviewed  All questions answered  Precautions given  Patient verbalized understanding and agreement with the treatment plan  Chief Complaint     Chief Complaint   Patient presents with    Cough     began last night, fever, chills, body aches     History of Present Illness     23 y/o female with c/o cough x1 day  The symptoms began last night with a cough  Today with onset of fever, chills, sweats, body aches, headache, and dizziness  Reports nasal congestion, runny nose, and a sore throat from coughing  Reports SULLIVAN but otherwise no resting SOB or wheezing  Slight nausea from coughing  Denies ear pain, vomiting, or diarrhea  Has attempted treating with Advil times single dose this morning and cough drops  Friend sick 1-2 weeks ago with similar sx  No recent travel  Nonsmoker  No flu shot  Review of Systems   Review of Systems   Constitutional: Positive for chills, diaphoresis, fatigue and fever  HENT: Positive for congestion, rhinorrhea and sore throat  Negative for ear pain      Respiratory: Positive for cough and shortness of breath  Negative for wheezing  Cardiovascular: Negative for chest pain and palpitations  Gastrointestinal: Positive for nausea  Negative for abdominal pain, diarrhea and vomiting  Musculoskeletal: Positive for myalgias  Skin: Negative for rash  Neurological: Positive for dizziness and headaches  Current Medications       Current Outpatient Medications:     escitalopram (LEXAPRO) 20 mg tablet, Take 1 tablet (20 mg total) by mouth daily, Disp: 90 tablet, Rfl: 3    fluticasone (FLONASE) 50 mcg/act nasal spray, 2 sprays into each nostril daily (Patient taking differently: 2 sprays into each nostril as needed ), Disp: 16 g, Rfl: 0    magnesium oxide (MAG-OX) 400 mg, 1 in a m  Daily, Disp: 30 tablet, Rfl: 3    tiZANidine (ZANAFLEX) 2 mg tablet, Half a tab in a m  And 1 tab at bedtime, Disp: 60 tablet, Rfl: 3    topiramate (TOPAMAX) 50 MG tablet, 1 in a m  and 3 at bedtime, Disp: 120 tablet, Rfl: 3    albuterol (PROVENTIL HFA,VENTOLIN HFA) 90 mcg/act inhaler, INHALE 2 PUFFS BY MOUTH ONCE FOR 1 DOSE 15 TO 30 MINUTES PRIOR TO EXERCISE, Disp: , Rfl: 0    etonogestrel-ethinyl estradiol (NUVARING) 0 12-0 015 MG/24HR vaginal ring, Insert vaginally and leave in place for 3 consecutive weeks, then remove for 1 week   (Patient not taking: Reported on 10/21/2019), Disp: 3 each, Rfl: 3    Riboflavin (VITAMIN B-2) 100 MG TABS, 2 in the morning and 2 at bedtime (Patient not taking: Reported on 2/26/2020), Disp: 120 tablet, Rfl: 3    Current Allergies     Allergies as of 02/26/2020    (No Known Allergies)            The following portions of the patient's history were reviewed and updated as appropriate: allergies, current medications, past family history, past medical history, past social history, past surgical history and problem list      Past Medical History:   Diagnosis Date    Acute post-traumatic stress disorder     Cancer ([facility] [address] )     Breast     Concussion     resolved 12/20/2016       Past Surgical History:   Procedure Laterality Date    NO PAST SURGERIES         Family History   Problem Relation Age of Onset    Allergic rhinitis Father     Breast cancer Maternal Grandmother     Other Paternal Grandmother         Surgery Complication     Alcohol abuse Paternal Grandfather        Medications have been verified  Objective   /74   Pulse 85   Temp 100 3 °F (37 9 °C)   Resp 18   Ht 5' 6" (1 676 m)   Wt 93 9 kg (207 lb)   LMP 02/22/2020   SpO2 99%   BMI 33 41 kg/m²      Physical Exam     Physical Exam   Constitutional: Vital signs are normal  She appears well-developed and well-nourished  She is cooperative  She appears ill  No distress  HENT:   Head: Normocephalic and atraumatic  Right Ear: Hearing, tympanic membrane, external ear and ear canal normal    Left Ear: Hearing, tympanic membrane, external ear and ear canal normal    Nose: Rhinorrhea present  Mouth/Throat: Uvula is midline, oropharynx is clear and moist and mucous membranes are normal  Mucous membranes are not pale, not dry and not cyanotic  No oral lesions  No uvula swelling  No oropharyngeal exudate, posterior oropharyngeal edema, posterior oropharyngeal erythema or tonsillar abscesses  Eyes: Conjunctivae and lids are normal  Right eye exhibits no discharge and no exudate  Left eye exhibits no discharge and no exudate  Neck: Trachea normal and phonation normal  Neck supple  No tracheal tenderness present  No neck rigidity  No edema and no erythema present  Cardiovascular: Normal rate, regular rhythm and normal heart sounds  Exam reveals no distant heart sounds  Pulmonary/Chest: Effort normal  No stridor  No respiratory distress  She has decreased breath sounds (slight decrease throughout)  She has no wheezes  She has no rhonchi  She has no rales  Abdominal: Soft  Bowel sounds are normal  She exhibits no distension and no mass  There is no tenderness   There is no rigidity, no rebound and no guarding  Lymphadenopathy:     She has no cervical adenopathy  Neurological: She is alert  She is not disoriented  No cranial nerve deficit  Coordination and gait normal    Skin: Skin is warm, dry and intact  No rash noted  She is not diaphoretic  No erythema  No pallor  Psychiatric: She has a normal mood and affect  Her behavior is normal  Judgment and thought content normal    Nursing note and vitals reviewed

## 2020-02-26 NOTE — LETTER
February 26, 2020     Patient: Tiffany Solomon   YOB: 1995   Date of Visit: 2/26/2020       To Whom It May Concern: It is my medical opinion that Anabela Ward may return to work when fever free for 24 hours without the use of fever reducing medications  If you have any questions or concerns, please don't hesitate to call           Sincerely,        Nina Donaldson PA-C

## 2020-02-28 NOTE — TELEPHONE ENCOUNTER
Called patient and left a message to call back to schedule her New Start Botox appointment  She is scheduled to see Dr Verenice Mccoy on 05/04/20 at 01:00pm  We can change that to the Botox and then schedule a six week follow up after that

## 2020-03-02 NOTE — TELEPHONE ENCOUNTER
Called patient and left a message to call back to schedule her New Start Botox appointment   She is scheduled to see Dr Jazmyn Martinez on 05/04/20 at 01:00pm  We can change that to the Botox and then schedule a six week follow up after that

## 2020-03-09 NOTE — TELEPHONE ENCOUNTER
Called patient and left a message to call back to schedule her New Start Botox appointment   She is scheduled to see Dr Louise Tabares on 05/04/20 at 01:00pm  We can change that to the Botox and then schedule a six week follow up after that

## 2020-03-10 ENCOUNTER — TELEPHONE (OUTPATIENT)
Dept: FAMILY MEDICINE CLINIC | Facility: CLINIC | Age: 25
End: 2020-03-10

## 2020-03-10 NOTE — TELEPHONE ENCOUNTER
Called patient and left a message to call back to schedule her New Start Botox appointment   She is scheduled to see Dr Jose Eduardo Stack on 05/04/20 at 01:00pm  We can change that to the Botox and then schedule a six week follow up after that

## 2020-03-10 NOTE — TELEPHONE ENCOUNTER
Patient called back and I scheduled her for 05/04/20 at 01:00pm in the [facility] Location with Dr Dora Pike

## 2020-04-08 ENCOUNTER — TELEPHONE (OUTPATIENT)
Dept: NEUROLOGY | Facility: CLINIC | Age: 25
End: 2020-04-08

## 2020-07-27 ENCOUNTER — OFFICE VISIT (OUTPATIENT)
Dept: FAMILY MEDICINE CLINIC | Facility: CLINIC | Age: 25
End: 2020-07-27
Payer: COMMERCIAL

## 2020-07-27 VITALS
TEMPERATURE: 97 F | RESPIRATION RATE: 16 BRPM | WEIGHT: 203 LBS | DIASTOLIC BLOOD PRESSURE: 70 MMHG | OXYGEN SATURATION: 97 % | BODY MASS INDEX: 32.62 KG/M2 | SYSTOLIC BLOOD PRESSURE: 110 MMHG | HEIGHT: 66 IN | HEART RATE: 84 BPM

## 2020-07-27 DIAGNOSIS — G44.221 CHRONIC TENSION-TYPE HEADACHE, INTRACTABLE: ICD-10-CM

## 2020-07-27 DIAGNOSIS — M54.6 CHRONIC MIDLINE THORACIC BACK PAIN: ICD-10-CM

## 2020-07-27 DIAGNOSIS — G89.29 CHRONIC MIDLINE THORACIC BACK PAIN: ICD-10-CM

## 2020-07-27 DIAGNOSIS — N62 MACROMASTIA: Primary | ICD-10-CM

## 2020-07-27 PROCEDURE — 1036F TOBACCO NON-USER: CPT | Performed by: NURSE PRACTITIONER

## 2020-07-27 PROCEDURE — 3008F BODY MASS INDEX DOCD: CPT | Performed by: NURSE PRACTITIONER

## 2020-07-27 PROCEDURE — 99213 OFFICE O/P EST LOW 20 MIN: CPT | Performed by: NURSE PRACTITIONER

## 2020-07-27 NOTE — PROGRESS NOTES
Assessment/Plan:    Referral given for plastic surgery  I do feel that she would be an excellent candidate for breast reduction surgery d/t her chronic back and neck pain associated with macromastia  1  Chronic midline thoracic back pain    2  Chronic tension-type headache, intractable    3  Macromastia  -     Ambulatory referral to Plastic Surgery; Future      BMI Counseling: Body mass index is 32 77 kg/m²  The BMI is above normal  Nutrition recommendations include consuming healthier snacks  Exercise recommendations include moderate physical activity 150 minutes/week  There are no Patient Instructions on file for this visit  No follow-ups on file  Subjective:      Patient ID: Livan Ruelas is a 22 y o  female  Chief Complaint   Patient presents with    discuss breast reduction     prcma       Here today to discuss chronic issues with back pain  She attributes her pain to her large breast size  She has chronic, daily pain affecting her neck, thoracic spine, and now low back as well  She fell the other day d/t muscle spasms in her back  She has chronic neck issues, was recommended Botox, but she is hesitant to proceed with that  Reports only mild reduction in breast size associated with weight loss  She would like to proceed with consultation of breast reduction surgery  The following portions of the patient's history were reviewed and updated as appropriate: allergies, current medications, past family history, past medical history, past social history, past surgical history and problem list     Review of Systems   Constitutional: Negative  Respiratory: Negative  Cardiovascular: Negative  Musculoskeletal: Positive for back pain  Neurological: Negative for weakness and numbness           Current Outpatient Medications   Medication Sig Dispense Refill    albuterol (PROVENTIL HFA,VENTOLIN HFA) 90 mcg/act inhaler INHALE 2 PUFFS BY MOUTH ONCE FOR 1 DOSE 15 TO 30 MINUTES PRIOR TO EXERCISE  0    fluticasone (FLONASE) 50 mcg/act nasal spray 2 sprays into each nostril daily (Patient taking differently: 2 sprays into each nostril as needed ) 16 g 0    Riboflavin (VITAMIN B-2) 100 MG TABS 2 in the morning and 2 at bedtime 120 tablet 3    tiZANidine (ZANAFLEX) 2 mg tablet Half a tab in a m  And 1 tab at bedtime 60 tablet 3    topiramate (TOPAMAX) 50 MG tablet 1 in a m  and 3 at bedtime 120 tablet 3    escitalopram (LEXAPRO) 20 mg tablet Take 1 tablet (20 mg total) by mouth daily (Patient not taking: Reported on 7/27/2020) 90 tablet 3    etonogestrel-ethinyl estradiol (NUVARING) 0 12-0 015 MG/24HR vaginal ring Insert vaginally and leave in place for 3 consecutive weeks, then remove for 1 week  (Patient not taking: Reported on 10/21/2019) 3 each 3    magnesium oxide (MAG-OX) 400 mg 1 in a m  Daily 30 tablet 3     No current facility-administered medications for this visit  Objective:    /70   Pulse 84   Temp (!) 97 °F (36 1 °C)   Resp 16   Ht 5' 6" (1 676 m)   Wt 92 1 kg (203 lb)   SpO2 97%   BMI 32 77 kg/m²        Physical Exam   Constitutional: She appears well-developed and well-nourished  Cardiovascular: Normal rate, regular rhythm and normal heart sounds  No murmur heard  Pulmonary/Chest: Effort normal and breath sounds normal    Musculoskeletal:        Cervical back: She exhibits tenderness  Thoracic back: She exhibits tenderness  Neurological: She is alert  Skin: Skin is warm and dry  Psychiatric: She has a normal mood and affect  Nursing note and vitals reviewed               Rai Guillermo

## 2020-07-30 DIAGNOSIS — G43.701 CHRONIC MIGRAINE WITHOUT AURA WITH STATUS MIGRAINOSUS, NOT INTRACTABLE: ICD-10-CM

## 2020-07-31 RX ORDER — TOPIRAMATE 50 MG/1
TABLET, FILM COATED ORAL
Qty: 120 TABLET | Refills: 0 | Status: SHIPPED | OUTPATIENT
Start: 2020-07-31 | End: 2021-01-26

## 2020-09-25 ENCOUNTER — TELEPHONE (OUTPATIENT)
Dept: FAMILY MEDICINE CLINIC | Facility: CLINIC | Age: 25
End: 2020-09-25

## 2020-09-25 NOTE — TELEPHONE ENCOUNTER
Regarding: Non-Urgent Medical Question  Contact: [phone]  ----- Message from Fide Charles sent at 9/25/2020  9:43 AM EDT -----       ----- Message from Lucia Calderón to [employer] sent at 9/23/2020 11:02 AM -----   hello! when i came in last you had told me to go and research doctors for my reduction  i just had a consult and i totally fell in love with her and her procedure plan and she seems really great     she's Dr Starr Bowser in [address] [place] [address]  She said she was going to send my paperwork in but i know you mentioned sending in a letter as well to help with the insurance to show i've been coming for a while about the pain  she told me to tell you to fax it over to her office    [phone]       but if you have any questions you can definitely call me       thank you so much!!!   Brooks Escobedo

## 2020-10-29 ENCOUNTER — TELEPHONE (OUTPATIENT)
Dept: FAMILY MEDICINE CLINIC | Facility: CLINIC | Age: 25
End: 2020-10-29

## 2020-12-11 ENCOUNTER — TELEPHONE (OUTPATIENT)
Dept: FAMILY MEDICINE CLINIC | Facility: CLINIC | Age: 25
End: 2020-12-11

## 2021-01-12 ENCOUNTER — TELEPHONE (OUTPATIENT)
Dept: FAMILY MEDICINE CLINIC | Facility: CLINIC | Age: 26
End: 2021-01-12

## 2021-01-12 ENCOUNTER — LAB (OUTPATIENT)
Dept: LAB | Facility: HOSPITAL | Age: 26
End: 2021-01-12
Payer: COMMERCIAL

## 2021-01-12 ENCOUNTER — TRANSCRIBE ORDERS (OUTPATIENT)
Dept: ADMINISTRATIVE | Facility: HOSPITAL | Age: 26
End: 2021-01-12

## 2021-01-12 DIAGNOSIS — Z01.818 PREOP EXAMINATION: ICD-10-CM

## 2021-01-12 DIAGNOSIS — Z01.818 PREOP EXAMINATION: Primary | ICD-10-CM

## 2021-01-12 LAB
ALBUMIN SERPL BCP-MCNC: 3.9 G/DL (ref 3.5–5)
ALP SERPL-CCNC: 53 U/L (ref 46–116)
ALT SERPL W P-5'-P-CCNC: 38 U/L (ref 12–78)
ANION GAP SERPL CALCULATED.3IONS-SCNC: 8 MMOL/L (ref 4–13)
AST SERPL W P-5'-P-CCNC: 28 U/L (ref 5–45)
BASOPHILS # BLD AUTO: 0.04 THOUSANDS/ΜL (ref 0–0.1)
BASOPHILS NFR BLD AUTO: 1 % (ref 0–1)
BILIRUB SERPL-MCNC: 0.4 MG/DL (ref 0.2–1)
BUN SERPL-MCNC: 11 MG/DL (ref 5–25)
CALCIUM SERPL-MCNC: 9.2 MG/DL (ref 8.3–10.1)
CHLORIDE SERPL-SCNC: 102 MMOL/L (ref 100–108)
CO2 SERPL-SCNC: 25 MMOL/L (ref 21–32)
CREAT SERPL-MCNC: 0.62 MG/DL (ref 0.6–1.3)
EOSINOPHIL # BLD AUTO: 0.07 THOUSAND/ΜL (ref 0–0.61)
EOSINOPHIL NFR BLD AUTO: 1 % (ref 0–6)
ERYTHROCYTE [DISTWIDTH] IN BLOOD BY AUTOMATED COUNT: 12.2 % (ref 11.6–15.1)
GFR SERPL CREATININE-BSD FRML MDRD: 127 ML/MIN/1.73SQ M
GLUCOSE P FAST SERPL-MCNC: 88 MG/DL (ref 65–99)
HCT VFR BLD AUTO: 40.6 % (ref 34.8–46.1)
HGB BLD-MCNC: 12.9 G/DL (ref 11.5–15.4)
IMM GRANULOCYTES # BLD AUTO: 0.03 THOUSAND/UL (ref 0–0.2)
IMM GRANULOCYTES NFR BLD AUTO: 0 % (ref 0–2)
LYMPHOCYTES # BLD AUTO: 2.75 THOUSANDS/ΜL (ref 0.6–4.47)
LYMPHOCYTES NFR BLD AUTO: 34 % (ref 14–44)
MCH RBC QN AUTO: 27.4 PG (ref 26.8–34.3)
MCHC RBC AUTO-ENTMCNC: 31.8 G/DL (ref 31.4–37.4)
MCV RBC AUTO: 86 FL (ref 82–98)
MONOCYTES # BLD AUTO: 0.48 THOUSAND/ΜL (ref 0.17–1.22)
MONOCYTES NFR BLD AUTO: 6 % (ref 4–12)
NEUTROPHILS # BLD AUTO: 4.85 THOUSANDS/ΜL (ref 1.85–7.62)
NEUTS SEG NFR BLD AUTO: 58 % (ref 43–75)
NRBC BLD AUTO-RTO: 0 /100 WBCS
PLATELET # BLD AUTO: 198 THOUSANDS/UL (ref 149–390)
PMV BLD AUTO: 11.2 FL (ref 8.9–12.7)
POTASSIUM SERPL-SCNC: 3.8 MMOL/L (ref 3.5–5.3)
PROT SERPL-MCNC: 7.5 G/DL (ref 6.4–8.2)
RBC # BLD AUTO: 4.7 MILLION/UL (ref 3.81–5.12)
SODIUM SERPL-SCNC: 135 MMOL/L (ref 136–145)
WBC # BLD AUTO: 8.22 THOUSAND/UL (ref 4.31–10.16)

## 2021-01-12 PROCEDURE — 85025 COMPLETE CBC W/AUTO DIFF WBC: CPT

## 2021-01-12 PROCEDURE — 36415 COLL VENOUS BLD VENIPUNCTURE: CPT

## 2021-01-12 PROCEDURE — 80053 COMPREHEN METABOLIC PANEL: CPT

## 2021-01-12 NOTE — TELEPHONE ENCOUNTER
Pt needs labs done prior to preop exam   I placed the orders, they need to be done fasting prior to her visit  We will do an EKG in the office  Please let her know  Thanks!

## 2021-01-26 ENCOUNTER — TELEPHONE (OUTPATIENT)
Dept: FAMILY MEDICINE CLINIC | Facility: CLINIC | Age: 26
End: 2021-01-26

## 2021-01-26 ENCOUNTER — OFFICE VISIT (OUTPATIENT)
Dept: FAMILY MEDICINE CLINIC | Facility: CLINIC | Age: 26
End: 2021-01-26
Payer: COMMERCIAL

## 2021-01-26 VITALS
BODY MASS INDEX: 32.77 KG/M2 | RESPIRATION RATE: 16 BRPM | SYSTOLIC BLOOD PRESSURE: 114 MMHG | DIASTOLIC BLOOD PRESSURE: 68 MMHG | TEMPERATURE: 97.8 F | HEIGHT: 66 IN | HEART RATE: 70 BPM

## 2021-01-26 DIAGNOSIS — N62 MACROMASTIA: ICD-10-CM

## 2021-01-26 DIAGNOSIS — Z01.818 PREOP EXAMINATION: Primary | ICD-10-CM

## 2021-01-26 DIAGNOSIS — G44.221 CHRONIC TENSION-TYPE HEADACHE, INTRACTABLE: ICD-10-CM

## 2021-01-26 PROCEDURE — 3725F SCREEN DEPRESSION PERFORMED: CPT | Performed by: NURSE PRACTITIONER

## 2021-01-26 PROCEDURE — 99215 OFFICE O/P EST HI 40 MIN: CPT | Performed by: NURSE PRACTITIONER

## 2021-01-26 PROCEDURE — 1036F TOBACCO NON-USER: CPT | Performed by: NURSE PRACTITIONER

## 2021-01-26 PROCEDURE — 93000 ELECTROCARDIOGRAM COMPLETE: CPT | Performed by: NURSE PRACTITIONER

## 2021-01-26 NOTE — PROGRESS NOTES
FAMILY PRACTICE PRE-OPERATIVE EVALUATION  [facility]    NAME: Mariama Hall  AGE: 26 y o  SEX: female  : 1995     DATE: 2021    Family Practice Pre-Operative Evaluation      Chief Complaint: Pre-operative Evaluation     Surgery: breast reduction  Anticipated Date of Surgery: 2/3/21  Surgeon: Dr Aldo Sneed        History of Present Illness:     Here today for preop exam prior to upcoming breast reduction surgery  Stopped her medications for migraines a couple of months ago, states she feels better since stopping everything  Occasional tension headaches, which she attributes to large breast size  No complaints or concerns today  Preop testing completed and reviewed  Anesthesia:  general  Bleeding Risk: no recent abnormal bleeding, no remote history of abnormal bleeding and no use of Ca-channel blockers  Current Anti-platelet/anticoagulation medication: none    Assessment of Cardiac Risk:  · Denies unstable or severe angina or MI in the last 6 weeks or history of stent placement in the last year   · Denies decompensated heart failure (e g  New onset heart failure, NYHA functional class IV heart failure, or worsening existing heart failure)  · Denies significant arrhythmias such as high grade AV block, symptomatic ventricular arrhythmia, newly recognized ventricular tachycardia, supraventricular tachycardia with resting heart rate >100, or symptomatic bradycardia  · Denies severe heart valve disease including aortic stenosis or symptomatic mitral stenosis     Exercise Capacity:  · Able to walk 4 blocks without symptoms?: Yes  · Able to walk 2 flights without symptoms?: Yes    Prior Anesthesia Reactions: has never been under anesthesia     Personal history of venous thromboembolic disease? No    History of steroid use for >2 weeks within last year?  No         Review of Systems:     Review of Systems   Constitutional: Negative for chills, fatigue and fever    Respiratory: Negative for cough, shortness of breath and wheezing  Cardiovascular: Negative for chest pain, palpitations and leg swelling  Gastrointestinal: Negative for abdominal pain, diarrhea, nausea and vomiting  Genitourinary: Negative  Skin: Negative for rash  Neurological: Negative for dizziness and headaches  Current Problem List:     Patient Active Problem List   Diagnosis    Arthralgia    Exercise-induced bronchospasm    Memory loss    Anxiety and depression    Obesity (BMI 30-39  9)    Headache, chronic migraine without aura    Chronic tension-type headache, intractable    Cervicalgia    Lumbago    Cervical dystonia       Allergies:     No Known Allergies    Current Medications:       Current Outpatient Medications:     albuterol (PROVENTIL HFA,VENTOLIN HFA) 90 mcg/act inhaler, INHALE 2 PUFFS BY MOUTH ONCE FOR 1 DOSE 15 TO 30 MINUTES PRIOR TO EXERCISE, Disp: , Rfl: 0    fluticasone (FLONASE) 50 mcg/act nasal spray, 2 sprays into each nostril daily (Patient taking differently: 2 sprays into each nostril as needed ), Disp: 16 g, Rfl: 0    Past Medical History:       Past Medical History:   Diagnosis Date    Acute post-traumatic stress disorder     Cancer ([facility] [address] )     Breast     Concussion     resolved 12/20/2016        Past Surgical History:   Procedure Laterality Date    NO PAST SURGERIES          Family History   Problem Relation Age of Onset    Allergic rhinitis Father     Breast cancer Maternal Grandmother     Other Paternal Grandmother         Surgery Complication     Alcohol abuse Paternal Grandfather         Social History     Socioeconomic History    Marital status: Single     Spouse name: Not on file    Number of children: Not on file    Years of education: Not on file    Highest education level: Not on file   Occupational History    Not on file   Social Needs    Financial resource strain: Not on file    Food insecurity     Worry: Not on file Inability: Not on file    Transportation needs     Medical: Not on file     Non-medical: Not on file   Tobacco Use    Smoking status: Never Smoker    Smokeless tobacco: Never Used   Substance and Sexual Activity    Alcohol use: Yes     Comment: Socially     Drug use: No    Sexual activity: Yes     Partners: Male     Birth control/protection: Condom Male   Lifestyle    Physical activity     Days per week: Not on file     Minutes per session: Not on file    Stress: Not on file   Relationships    Social connections     Talks on phone: Not on file     Gets together: Not on file     Attends [employer] service: Not on file     Active member of club or organization: Not on file     Attends meetings of clubs or organizations: Not on file     Relationship status: Not on file    Intimate partner violence     Fear of current or ex partner: Not on file     Emotionally abused: Not on file     Physically abused: Not on file     Forced sexual activity: Not on file   Other Topics Concern    Not on file   Social History Narrative    Not on file        Physical Exam:     /68   Pulse 70   Temp 97 8 °F (36 6 °C)   Resp 16   Ht 5' 6" (1 676 m)   LMP 01/05/2021 (Approximate)   BMI 32 77 kg/m²     Physical Exam  Constitutional:       Appearance: She is well-developed  HENT:      Head: Normocephalic and atraumatic  Right Ear: Tympanic membrane, ear canal and external ear normal       Left Ear: Tympanic membrane, ear canal and external ear normal       Nose: No mucosal edema or rhinorrhea  Mouth/Throat:      Pharynx: Uvula midline  Eyes:      Conjunctiva/sclera: Conjunctivae normal    Neck:      Musculoskeletal: Neck supple  No edema  Thyroid: No thyromegaly  Cardiovascular:      Rate and Rhythm: Normal rate and regular rhythm  Heart sounds: Normal heart sounds  No murmur  Pulmonary:      Effort: Pulmonary effort is normal       Breath sounds: Normal breath sounds     Abdominal: General: Bowel sounds are normal  There is no distension  Palpations: There is no hepatomegaly or splenomegaly  Tenderness: There is no abdominal tenderness  Lymphadenopathy:      Cervical:      Right cervical: No superficial cervical adenopathy  Left cervical: No superficial cervical adenopathy  Skin:     General: Skin is warm and dry  Capillary Refill: Capillary refill takes less than 2 seconds  Findings: No rash  Neurological:      General: No focal deficit present  Sensory: No sensory deficit  Deep Tendon Reflexes: Reflexes normal    Psychiatric:         Mood and Affect: Mood normal          Behavior: Behavior normal           Data:     Pre-operative work-up    Laboratory Results: I have personally reviewed the pertinent laboratory results/reports      EKG: I have personally reviewed pertinent reports        Recent Results (from the past 672 hour(s))   Comprehensive metabolic panel    Collection Time: 01/12/21  1:15 PM   Result Value Ref Range    Sodium 135 (L) 136 - 145 mmol/L    Potassium 3 8 3 5 - 5 3 mmol/L    Chloride 102 100 - 108 mmol/L    CO2 25 21 - 32 mmol/L    ANION GAP 8 4 - 13 mmol/L    BUN 11 5 - 25 mg/dL    Creatinine 0 62 0 60 - 1 30 mg/dL    Glucose, Fasting 88 65 - 99 mg/dL    Calcium 9 2 8 3 - 10 1 mg/dL    AST 28 5 - 45 U/L    ALT 38 12 - 78 U/L    Alkaline Phosphatase 53 46 - 116 U/L    Total Protein 7 5 6 4 - 8 2 g/dL    Albumin 3 9 3 5 - 5 0 g/dL    Total Bilirubin 0 40 0 20 - 1 00 mg/dL    eGFR 127 ml/min/1 73sq m   CBC and differential    Collection Time: 01/12/21  1:15 PM   Result Value Ref Range    WBC 8 22 4 31 - 10 16 Thousand/uL    RBC 4 70 3 81 - 5 12 Million/uL    Hemoglobin 12 9 11 5 - 15 4 g/dL    Hematocrit 40 6 34 8 - 46 1 %    MCV 86 82 - 98 fL    MCH 27 4 26 8 - 34 3 pg    MCHC 31 8 31 4 - 37 4 g/dL    RDW 12 2 11 6 - 15 1 %    MPV 11 2 8 9 - 12 7 fL    Platelets 246 466 - 446 Thousands/uL    nRBC 0 /100 WBCs    Neutrophils Relative 58 43 - 75 %    Immat GRANS % 0 0 - 2 %    Lymphocytes Relative 34 14 - 44 %    Monocytes Relative 6 4 - 12 %    Eosinophils Relative 1 0 - 6 %    Basophils Relative 1 0 - 1 %    Neutrophils Absolute 4 85 1 85 - 7 62 Thousands/µL    Immature Grans Absolute 0 03 0 00 - 0 20 Thousand/uL    Lymphocytes Absolute 2 75 0 60 - 4 47 Thousands/µL    Monocytes Absolute 0 48 0 17 - 1 22 Thousand/µL    Eosinophils Absolute 0 07 0 00 - 0 61 Thousand/µL    Basophils Absolute 0 04 0 00 - 0 10 Thousands/µL           Assessment & Recommendations:     Problem List Items Addressed This Visit        Nervous and Auditory    Chronic tension-type headache, intractable      Other Visit Diagnoses     Preop examination    -  Primary    Relevant Orders    POCT ECG (Completed)    Macromastia              Pre-Op Evaluation Assessment  26 y o  female with planned surgery: breast reduction  Known risk factors for perioperative complications: None  Current medications which may produce withdrawal symptoms if withheld perioperatively: none  Pre-Op Evaluation Plan  1  Further preoperative workup as follows:   - None; no further preoperative work-up is required    2  Medication Management/Recommendations:   - None, continue medication regimen including morning of surgery, with sip of water  - Patient has been instructed to avoid herbs or non-directed vitamins the week prior to surgery to ensure no drug interactions with perioperative surgical and anesthetic medications  - Patient has been instructed to avoid aspirin containing medications or non-steroidal anti-inflammatory drugs for the week preceding surgery  3  Prophylaxis for cardiac events with perioperative beta-blockers: not indicated  4  Patient requires further consultation with: None    Clearance  Patient is CLEARED for surgery without any additional cardiac testing       Luisa Jennings, [address]  [address] [facility]  [address]  [place] [address]  Phone#  [phone]  Fax#  [phone]

## 2021-07-13 ENCOUNTER — IMMUNIZATIONS (OUTPATIENT)
Dept: FAMILY MEDICINE CLINIC | Facility: HOSPITAL | Age: 26
End: 2021-07-13

## 2021-07-13 DIAGNOSIS — Z23 ENCOUNTER FOR IMMUNIZATION: Primary | ICD-10-CM

## 2021-07-13 PROCEDURE — 0011A SARS-COV-2 / COVID-19 MRNA VACCINE (MODERNA) 100 MCG: CPT

## 2021-07-13 PROCEDURE — 91301 SARS-COV-2 / COVID-19 MRNA VACCINE (MODERNA) 100 MCG: CPT

## 2021-08-10 ENCOUNTER — IMMUNIZATIONS (OUTPATIENT)
Dept: FAMILY MEDICINE CLINIC | Facility: HOSPITAL | Age: 26
End: 2021-08-10

## 2021-08-10 DIAGNOSIS — Z23 ENCOUNTER FOR IMMUNIZATION: Primary | ICD-10-CM

## 2021-08-10 PROCEDURE — 91301 SARS-COV-2 / COVID-19 MRNA VACCINE (MODERNA) 100 MCG: CPT

## 2021-08-10 PROCEDURE — 0012A SARS-COV-2 / COVID-19 MRNA VACCINE (MODERNA) 100 MCG: CPT

## 2021-10-13 ENCOUNTER — OFFICE VISIT (OUTPATIENT)
Dept: FAMILY MEDICINE CLINIC | Facility: CLINIC | Age: 26
End: 2021-10-13
Payer: COMMERCIAL

## 2021-10-13 VITALS
HEIGHT: 65 IN | RESPIRATION RATE: 16 BRPM | TEMPERATURE: 98 F | HEART RATE: 76 BPM | WEIGHT: 204 LBS | OXYGEN SATURATION: 100 % | BODY MASS INDEX: 33.99 KG/M2 | DIASTOLIC BLOOD PRESSURE: 76 MMHG | SYSTOLIC BLOOD PRESSURE: 112 MMHG

## 2021-10-13 DIAGNOSIS — Z00.00 ROUTINE ADULT HEALTH MAINTENANCE: Primary | ICD-10-CM

## 2021-10-13 DIAGNOSIS — Z13.6 SCREENING FOR CARDIOVASCULAR CONDITION: ICD-10-CM

## 2021-10-13 DIAGNOSIS — Z11.3 SCREEN FOR STD (SEXUALLY TRANSMITTED DISEASE): ICD-10-CM

## 2021-10-13 DIAGNOSIS — J45.990 EXERCISE-INDUCED BRONCHOSPASM: ICD-10-CM

## 2021-10-13 DIAGNOSIS — J02.9 ACUTE PHARYNGITIS, UNSPECIFIED ETIOLOGY: ICD-10-CM

## 2021-10-13 DIAGNOSIS — Z13.29 SCREENING FOR THYROID DISORDER: ICD-10-CM

## 2021-10-13 PROCEDURE — 1036F TOBACCO NON-USER: CPT | Performed by: NURSE PRACTITIONER

## 2021-10-13 PROCEDURE — 3008F BODY MASS INDEX DOCD: CPT | Performed by: NURSE PRACTITIONER

## 2021-10-13 PROCEDURE — 3725F SCREEN DEPRESSION PERFORMED: CPT | Performed by: NURSE PRACTITIONER

## 2021-10-13 PROCEDURE — 99395 PREV VISIT EST AGE 18-39: CPT | Performed by: NURSE PRACTITIONER

## 2021-10-13 RX ORDER — FLUTICASONE PROPIONATE 50 MCG
2 SPRAY, SUSPENSION (ML) NASAL DAILY
Qty: 16 G | Refills: 5 | Status: SHIPPED | OUTPATIENT
Start: 2021-10-13 | End: 2022-01-01 | Stop reason: SDUPTHER

## 2021-10-13 RX ORDER — ALBUTEROL SULFATE 90 UG/1
2 AEROSOL, METERED RESPIRATORY (INHALATION) EVERY 4 HOURS PRN
Qty: 8 G | Refills: 0 | Status: SHIPPED | OUTPATIENT
Start: 2021-10-13

## 2022-01-01 DIAGNOSIS — J02.9 ACUTE PHARYNGITIS, UNSPECIFIED ETIOLOGY: ICD-10-CM

## 2022-01-03 RX ORDER — FLUTICASONE PROPIONATE 50 MCG
2 SPRAY, SUSPENSION (ML) NASAL DAILY
Qty: 16 G | Refills: 0 | Status: SHIPPED | OUTPATIENT
Start: 2022-01-03

## 2022-09-15 ENCOUNTER — OFFICE VISIT (OUTPATIENT)
Dept: FAMILY MEDICINE CLINIC | Facility: CLINIC | Age: 27
End: 2022-09-15
Payer: COMMERCIAL

## 2022-09-15 VITALS
TEMPERATURE: 98 F | DIASTOLIC BLOOD PRESSURE: 86 MMHG | SYSTOLIC BLOOD PRESSURE: 116 MMHG | HEART RATE: 80 BPM | HEIGHT: 65 IN | BODY MASS INDEX: 32.49 KG/M2 | OXYGEN SATURATION: 99 % | WEIGHT: 195 LBS | RESPIRATION RATE: 16 BRPM

## 2022-09-15 DIAGNOSIS — Z30.011 ENCOUNTER FOR PRESCRIPTION OF ORAL CONTRACEPTIVES: ICD-10-CM

## 2022-09-15 DIAGNOSIS — T14.8XXA TRAUMATIC HEMATOMA: ICD-10-CM
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
## 2022-09-15 PROCEDURE — 99213 OFFICE O/P EST LOW 20 MIN: CPT | Performed by: NURSE PRACTITIONER

## 2022-09-15 RX ORDER — CETIRIZINE HYDROCHLORIDE 10 MG/1
10 TABLET ORAL DAILY
COMMUNITY

## 2022-09-15 RX ORDER — DROSPIRENONE AND ETHINYL ESTRADIOL 0.02-3(28)
1 KIT ORAL DAILY
Qty: 84 TABLET | Refills: 1 | Status: SHIPPED | OUTPATIENT
Start: 2022-09-15 | End: 2022-10-16 | Stop reason: SDUPTHER
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
## 2022-10-16 DIAGNOSIS — Z30.011 ENCOUNTER FOR PRESCRIPTION OF ORAL CONTRACEPTIVES: ICD-10-CM

## 2022-10-18 RX ORDER — DROSPIRENONE AND ETHINYL ESTRADIOL 0.02-3(28)
1 KIT ORAL DAILY
Qty: 84 TABLET | Refills: 0 | Status: SHIPPED | OUTPATIENT
Start: 2022-10-18

## 2022-10-24 ENCOUNTER — OFFICE VISIT (OUTPATIENT)
Dept: FAMILY MEDICINE CLINIC | Facility: CLINIC | Age: 27
End: 2022-10-24
Payer: COMMERCIAL

## 2022-10-24 VITALS
BODY MASS INDEX: 30.32 KG/M2 | HEART RATE: 61 BPM | OXYGEN SATURATION: 99 % | HEIGHT: 65 IN | WEIGHT: 182 LBS | TEMPERATURE: 97.6 F | RESPIRATION RATE: 18 BRPM | SYSTOLIC BLOOD PRESSURE: 124 MMHG | DIASTOLIC BLOOD PRESSURE: 84 MMHG

## 2022-10-24 DIAGNOSIS — U07.1 COVID-19 VIRUS INFECTION: Primary | ICD-10-CM

## 2022-10-24 DIAGNOSIS — J45.990 EXERCISE-INDUCED BRONCHOSPASM: ICD-10-CM

## 2022-10-24 PROCEDURE — 99213 OFFICE O/P EST LOW 20 MIN: CPT | Performed by: NURSE PRACTITIONER

## 2022-10-24 RX ORDER — ALBUTEROL SULFATE 90 UG/1
2 AEROSOL, METERED RESPIRATORY (INHALATION) EVERY 4 HOURS PRN
Qty: 8 G | Refills: 0 | Status: SHIPPED | OUTPATIENT
Start: 2022-10-24

## 2022-10-24 RX ORDER — BENZONATATE 200 MG/1
200 CAPSULE ORAL 3 TIMES DAILY PRN
Qty: 20 CAPSULE | Refills: 0 | Status: SHIPPED | OUTPATIENT
Start: 2022-10-24

## 2022-10-24 NOTE — PROGRESS NOTES
Assessment/Plan:    Discussed that cough with Covid 19 often lingers for several weeks  Will have her start on Tessalon and albuterol prn  F/u for any changes or worsening symptoms    1  COVID-19 virus infection  -     benzonatate (TESSALON) 200 MG capsule; Take 1 capsule (200 mg total) by mouth 3 (three) times a day as needed for cough    2  Exercise-induced bronchospasm  -     albuterol (PROVENTIL HFA,VENTOLIN HFA) 90 mcg/act inhaler; Inhale 2 puffs every 4 (four) hours as needed for wheezing          There are no Patient Instructions on file for this visit  No follow-ups on file  Subjective:      Patient ID: Mohamud Flores is a 33 y o  female  Chief Complaint   Patient presents with   • possible bronchitis    • Cough   • Nasal Congestion     Covid positive 3 weeks ago  [id number]       Tested positive for Covid about 3 weeks ago  Has had continued cough since then  Cough is dry  No significant SOB or chest tightness  No associated fevers  Sinuses are better, occasional runny nose  Not taking anything OTC for cough      Cough  This is a new problem  The current episode started in the past 7 days  The problem has been waxing and waning  The problem occurs constantly  The cough is non-productive and productive of sputum  Associated symptoms include myalgias, rhinorrhea and shortness of breath  Pertinent negatives include no chest pain, chills, ear congestion, ear pain, fever, headaches, heartburn, hemoptysis, nasal congestion, postnasal drip, rash, sore throat, sweats, weight loss or wheezing  The symptoms are aggravated by lying down  The following portions of the patient's history were reviewed and updated as appropriate: allergies, current medications, past family history, past medical history, past social history, past surgical history and problem list     Review of Systems   Constitutional: Negative for chills, fever and weight loss  HENT: Positive for rhinorrhea   Negative for ear pain, postnasal drip and sore throat  Respiratory: Positive for cough, chest tightness and shortness of breath  Negative for hemoptysis and wheezing  Cardiovascular: Negative for chest pain  Gastrointestinal: Negative for heartburn  Musculoskeletal: Positive for myalgias  Skin: Negative for rash  Neurological: Negative for headaches  Current Outpatient Medications   Medication Sig Dispense Refill   • albuterol (PROVENTIL HFA,VENTOLIN HFA) 90 mcg/act inhaler Inhale 2 puffs every 4 (four) hours as needed for wheezing 8 g 0   • benzonatate (TESSALON) 200 MG capsule Take 1 capsule (200 mg total) by mouth 3 (three) times a day as needed for cough 20 capsule 0   • cetirizine (ZyrTEC) 10 mg tablet Take 10 mg by mouth daily     • drospirenone-ethinyl estradiol (TIANNA) 3-0 02 MG per tablet Take 1 tablet by mouth daily 84 tablet 0   • fluticasone (FLONASE) 50 mcg/act nasal spray 2 sprays into each nostril daily (Patient not taking: Reported on 9/15/2022) 16 g 0     No current facility-administered medications for this visit  Objective:    /84   Pulse 61   Temp 97 6 °F (36 4 °C)   Resp 18   Ht 5' 5" (1 651 m)   Wt 82 6 kg (182 lb)   LMP 10/22/2022 (Exact Date)   SpO2 99%   BMI 30 29 kg/m²        Physical Exam  Vitals and nursing note reviewed  Constitutional:       Appearance: Normal appearance  She is well-developed  HENT:      Right Ear: Tympanic membrane normal       Left Ear: Tympanic membrane normal    Cardiovascular:      Rate and Rhythm: Normal rate and regular rhythm  Pulses: Normal pulses  Heart sounds: Normal heart sounds  No murmur heard  Pulmonary:      Effort: Pulmonary effort is normal       Breath sounds: Normal breath sounds  Abdominal:      General: Bowel sounds are normal    Skin:     General: Skin is warm and dry  Neurological:      Mental Status: She is alert     Psychiatric:         Mood and Affect: Mood normal          Behavior: Behavior normal  [place]

## 2023-08-02 DIAGNOSIS — Z30.011 ENCOUNTER FOR PRESCRIPTION OF ORAL CONTRACEPTIVES: ICD-10-CM

## 2023-08-02 DIAGNOSIS — J02.9 ACUTE PHARYNGITIS, UNSPECIFIED ETIOLOGY: ICD-10-CM

## 2023-08-02 RX ORDER — FLUTICASONE PROPIONATE 50 MCG
2 SPRAY, SUSPENSION (ML) NASAL DAILY
Qty: 16 G | Refills: 0 | Status: SHIPPED | OUTPATIENT
Start: 2023-08-02

## 2023-08-02 RX ORDER — DROSPIRENONE AND ETHINYL ESTRADIOL 0.02-3(28)
1 KIT ORAL DAILY
Qty: 84 TABLET | Refills: 0 | Status: SHIPPED | OUTPATIENT
Start: 2023-08-02

## 2023-10-09 ENCOUNTER — OFFICE VISIT (OUTPATIENT)
Dept: FAMILY MEDICINE CLINIC | Facility: CLINIC | Age: 28
End: 2023-10-09
Payer: COMMERCIAL

## 2023-10-09 VITALS
TEMPERATURE: 97.8 F | HEART RATE: 82 BPM | WEIGHT: 179.8 LBS | DIASTOLIC BLOOD PRESSURE: 80 MMHG | SYSTOLIC BLOOD PRESSURE: 128 MMHG | BODY MASS INDEX: 29.92 KG/M2 | RESPIRATION RATE: 18 BRPM

## 2023-10-09 DIAGNOSIS — J20.9 ACUTE BRONCHITIS, UNSPECIFIED ORGANISM: Primary | ICD-10-CM

## 2023-10-09 DIAGNOSIS — J45.990 EXERCISE-INDUCED BRONCHOSPASM: ICD-10-CM

## 2023-10-09 PROCEDURE — 99213 OFFICE O/P EST LOW 20 MIN: CPT | Performed by: NURSE PRACTITIONER

## 2023-10-09 RX ORDER — AZITHROMYCIN 250 MG/1
TABLET, FILM COATED ORAL
Qty: 6 TABLET | Refills: 0 | Status: SHIPPED | OUTPATIENT
Start: 2023-10-09 | End: 2023-10-14

## 2023-10-09 RX ORDER — ALBUTEROL SULFATE 90 UG/1
2 AEROSOL, METERED RESPIRATORY (INHALATION) EVERY 4 HOURS PRN
Qty: 8 G | Refills: 0 | Status: SHIPPED | OUTPATIENT
Start: 2023-10-09

## 2023-10-09 RX ORDER — BENZONATATE 200 MG/1
200 CAPSULE ORAL 3 TIMES DAILY PRN
Qty: 20 CAPSULE | Refills: 0 | Status: SHIPPED | OUTPATIENT
Start: 2023-10-09

## 2023-10-09 NOTE — PROGRESS NOTES
Assessment/Plan:    F/u for any persistent/worsening symptoms    1. Acute bronchitis, unspecified organism  -     azithromycin (ZITHROMAX) 250 mg tablet; 2 tabs PO day 1, then 1 tab PO days 2-5  -     benzonatate (TESSALON) 200 MG capsule; Take 1 capsule (200 mg total) by mouth 3 (three) times a day as needed for cough    2. Exercise-induced bronchospasm  -     albuterol (PROVENTIL HFA,VENTOLIN HFA) 90 mcg/act inhaler; Inhale 2 puffs every 4 (four) hours as needed for wheezing            There are no Patient Instructions on file for this visit. No follow-ups on file. Subjective:      Patient ID: Gwendolyn Mireles is a 33 y.o. female. Chief Complaint   Patient presents with   • Sick Visit     Sinus infection that wont go away, with cough. Pt tested negative for covid at home   [address]        She has been sick for over a week with URI symptoms. Sinus symptoms have improved, but has persistent harsh, dry cough and SOB. Has tried OTCs without relief. Cough  This is a recurrent problem. The current episode started in the past 7 days. The problem has been unchanged. The problem occurs every few minutes. The cough is non-productive, productive of sputum and productive of purulent sputum. Associated symptoms include chest pain, nasal congestion, postnasal drip and shortness of breath. Pertinent negatives include no chills, ear congestion, ear pain, fever, headaches, heartburn, hemoptysis, myalgias, rash, rhinorrhea, sore throat, sweats, weight loss or wheezing. The symptoms are aggravated by lying down. The following portions of the patient's history were reviewed and updated as appropriate: allergies, current medications, past family history, past medical history, past social history, past surgical history and problem list.    Review of Systems   Constitutional: Negative for chills, fever and weight loss. HENT: Positive for postnasal drip. Negative for ear pain, rhinorrhea and sore throat. Respiratory: Positive for cough and shortness of breath. Negative for hemoptysis and wheezing. Cardiovascular: Positive for chest pain. Gastrointestinal: Negative for heartburn. Musculoskeletal: Negative for myalgias. Skin: Negative for rash. Neurological: Negative for headaches. Current Outpatient Medications   Medication Sig Dispense Refill   • albuterol (PROVENTIL HFA,VENTOLIN HFA) 90 mcg/act inhaler Inhale 2 puffs every 4 (four) hours as needed for wheezing 8 g 0   • azithromycin (ZITHROMAX) 250 mg tablet 2 tabs PO day 1, then 1 tab PO days 2-5 6 tablet 0   • benzonatate (TESSALON) 200 MG capsule Take 1 capsule (200 mg total) by mouth 3 (three) times a day as needed for cough 20 capsule 0   • cetirizine (ZyrTEC) 10 mg tablet Take 10 mg by mouth daily     • fluticasone (FLONASE) 50 mcg/act nasal spray 2 sprays into each nostril daily 16 g 0     No current facility-administered medications for this visit. Objective:    /80   Pulse 82   Temp 97.8 °F (36.6 °C)   Resp 18   Wt 81.6 kg (179 lb 12.8 oz)   LMP 09/19/2023   BMI 29.92 kg/m²        Physical Exam  Vitals and nursing note reviewed. Constitutional:       Appearance: Normal appearance. She is well-developed. HENT:      Right Ear: Tympanic membrane normal.      Left Ear: Tympanic membrane normal.      Nose: Nose normal.      Mouth/Throat:      Pharynx: Oropharynx is clear. Eyes:      Conjunctiva/sclera: Conjunctivae normal.   Cardiovascular:      Rate and Rhythm: Normal rate and regular rhythm. Pulses: Normal pulses. Heart sounds: Normal heart sounds. No murmur heard. Pulmonary:      Effort: Pulmonary effort is normal.      Breath sounds: Normal breath sounds. Comments: Harsh, dry cough  Lymphadenopathy:      Cervical: No cervical adenopathy. Skin:     General: Skin is warm and dry. Neurological:      Mental Status: She is alert.    Psychiatric:         Mood and Affect: Mood normal. Behavior: Behavior normal.                PATTI Lennon

## 2024-01-15 ENCOUNTER — TELEPHONE (OUTPATIENT)
Dept: FAMILY MEDICINE CLINIC | Facility: CLINIC | Age: 29
End: 2024-01-15

## 2024-01-15 NOTE — TELEPHONE ENCOUNTER
Patient scheduled an appointment via My Chart for 1/17 at 10:30 with Tennille.  There isn't a reason for the visit.  Please call the patient and obtain reason for appointment.

## 2024-01-17 ENCOUNTER — OFFICE VISIT (OUTPATIENT)
Dept: FAMILY MEDICINE CLINIC | Facility: CLINIC | Age: 29
End: 2024-01-17
Payer: COMMERCIAL

## 2024-01-17 VITALS
WEIGHT: 184.2 LBS | HEIGHT: 65 IN | SYSTOLIC BLOOD PRESSURE: 108 MMHG | DIASTOLIC BLOOD PRESSURE: 64 MMHG | TEMPERATURE: 98.6 F | RESPIRATION RATE: 16 BRPM | HEART RATE: 86 BPM | BODY MASS INDEX: 30.69 KG/M2 | OXYGEN SATURATION: 98 %

## 2024-01-17 DIAGNOSIS — Z30.011 ENCOUNTER FOR PRESCRIPTION OF ORAL CONTRACEPTIVES: ICD-10-CM

## 2024-01-17 PROBLEM — E66.9 OBESITY (BMI 30-39.9): Status: RESOLVED | Noted: 2019-07-09 | Resolved: 2024-01-17

## 2024-01-17 PROCEDURE — 99213 OFFICE O/P EST LOW 20 MIN: CPT | Performed by: NURSE PRACTITIONER

## 2024-01-17 RX ORDER — DROSPIRENONE AND ETHINYL ESTRADIOL 0.02-3(28)
1 KIT ORAL DAILY
Qty: 84 TABLET | Refills: 2 | Status: SHIPPED | OUTPATIENT
Start: 2024-01-17

## 2024-01-17 NOTE — PROGRESS NOTES
"Assessment/Plan:    She will continue Tianna as she seems to be tolerating it well this time around.   To f/u for CPE or for any new problems    1. Encounter for prescription of oral contraceptives  -     drospirenone-ethinyl estradiol (TIANNA) 3-0.02 MG per tablet; Take 1 tablet by mouth daily          There are no Patient Instructions on file for this visit.    Return for Annual physical.    Subjective:      Patient ID: Aundrea Núñez is a 28 y.o. female.    Chief Complaint   Patient presents with   • discussion     Birth control options, Raiza/IVANNA       Following up today on OCPs.  Was previously prescribed them however had continuous bleeding for a major duration of the pill pack, so she stopped.  She decided to try again a couple of months ago, currently finished second pack and not having any problems.  Cycles have been regular with minimal cramping and bleeding.        The following portions of the patient's history were reviewed and updated as appropriate: allergies, current medications, past family history, past medical history, past social history, past surgical history and problem list.    Review of Systems   Constitutional: Negative.    Genitourinary:         See HPI         Current Outpatient Medications   Medication Sig Dispense Refill   • albuterol (PROVENTIL HFA,VENTOLIN HFA) 90 mcg/act inhaler Inhale 2 puffs every 4 (four) hours as needed for wheezing 8 g 0   • cetirizine (ZyrTEC) 10 mg tablet Take 10 mg by mouth daily     • drospirenone-ethinyl estradiol (TIANNA) 3-0.02 MG per tablet Take 1 tablet by mouth daily 84 tablet 2   • fluticasone (FLONASE) 50 mcg/act nasal spray 2 sprays into each nostril daily 16 g 0     No current facility-administered medications for this visit.       Objective:    /64   Pulse 86   Temp 98.6 °F (37 °C) (Temporal)   Resp 16   Ht 5' 5\" (1.651 m)   Wt 83.6 kg (184 lb 3.2 oz)   LMP 12/30/2023 (Exact Date)   SpO2 98%   BMI 30.65 kg/m²        Physical Exam  Vitals and " nursing note reviewed.   Constitutional:       Appearance: Normal appearance. She is well-developed. She is not ill-appearing or diaphoretic.   HENT:      Head: Normocephalic and atraumatic.   Eyes:      Conjunctiva/sclera: Conjunctivae normal.   Pulmonary:      Effort: Pulmonary effort is normal. No tachypnea, accessory muscle usage or respiratory distress.   Musculoskeletal:      Cervical back: Normal range of motion.   Skin:     Coloration: Skin is not pale.   Neurological:      Mental Status: She is alert.   Psychiatric:         Mood and Affect: Mood normal.         Speech: Speech normal.         Behavior: Behavior normal.                PATTI McbrideDepression Screening Follow-up Plan: Patient's depression screening was positive with a PHQ-2 score of . Their PHQ-9 score was 5. Clinically patient does not have depression. No treatment is required.

## 2024-04-19 DIAGNOSIS — Z30.011 ENCOUNTER FOR PRESCRIPTION OF ORAL CONTRACEPTIVES: ICD-10-CM

## 2024-04-22 RX ORDER — DROSPIRENONE AND ETHINYL ESTRADIOL 0.02-3(28)
1 KIT ORAL DAILY
Qty: 84 TABLET | Refills: 0 | Status: SHIPPED | OUTPATIENT
Start: 2024-04-22

## 2024-07-02 DIAGNOSIS — Z30.011 ENCOUNTER FOR PRESCRIPTION OF ORAL CONTRACEPTIVES: ICD-10-CM

## 2024-07-02 RX ORDER — DROSPIRENONE AND ETHINYL ESTRADIOL 0.02-3(28)
1 KIT ORAL DAILY
Qty: 84 TABLET | Refills: 1 | Status: SHIPPED | OUTPATIENT
Start: 2024-07-02

## 2024-09-07 ENCOUNTER — OFFICE VISIT (OUTPATIENT)
Dept: URGENT CARE | Facility: CLINIC | Age: 29
End: 2024-09-07
Payer: COMMERCIAL

## 2024-09-07 VITALS
BODY MASS INDEX: 29.55 KG/M2 | SYSTOLIC BLOOD PRESSURE: 110 MMHG | RESPIRATION RATE: 18 BRPM | DIASTOLIC BLOOD PRESSURE: 68 MMHG | OXYGEN SATURATION: 99 % | TEMPERATURE: 98.1 F | WEIGHT: 177.6 LBS | HEART RATE: 76 BPM

## 2024-09-07 DIAGNOSIS — J06.9 UPPER RESPIRATORY TRACT INFECTION, UNSPECIFIED TYPE: Primary | ICD-10-CM

## 2024-09-07 LAB
SARS-COV-2 AG UPPER RESP QL IA: NEGATIVE
VALID CONTROL: NORMAL

## 2024-09-07 PROCEDURE — 99203 OFFICE O/P NEW LOW 30 MIN: CPT | Performed by: PHYSICIAN ASSISTANT

## 2024-09-07 PROCEDURE — 87811 SARS-COV-2 COVID19 W/OPTIC: CPT | Performed by: PHYSICIAN ASSISTANT

## 2024-09-07 RX ORDER — PREDNISONE 20 MG/1
20 TABLET ORAL DAILY
Qty: 4 TABLET | Refills: 0 | Status: SHIPPED | OUTPATIENT
Start: 2024-09-07 | End: 2024-09-11

## 2024-09-07 RX ORDER — ALBUTEROL SULFATE 90 UG/1
2 AEROSOL, METERED RESPIRATORY (INHALATION) EVERY 4 HOURS PRN
Qty: 6.7 G | Refills: 0 | Status: SHIPPED | OUTPATIENT
Start: 2024-09-07

## 2024-09-07 NOTE — PROGRESS NOTES
[facility]        NAME: Aundrea Núñez is a 28 y.o. female  : 1995    MRN: [id number]  DATE: 2024  TIME: 3:56 PM    Assessment and Plan   Upper respiratory tract infection, unspecified type [J06.9]  1. Upper respiratory tract infection, unspecified type  Poct Covid 19 Rapid Antigen Test    albuterol (Proventil HFA) 90 mcg/act inhaler    predniSONE 20 mg tablet        Will treat for bronchitis since pt states she gets it frequently and it feels like this. Discussed importance of staying hydrated. Discussed supportive care and otc meds for symptomatic relief. May use tea with honey and a cool mist humidifier for symptoms. Encouraged salt water gargles if sore throat. Discussed strict return to care precautions as well as red flag symptoms which should prompt immediate ED referral. Pt verbalized understanding and is in agreement with plan.  Please follow up with your primary care provider within the next week. Please remember that your visit today was with an urgent care provider and should not replace follow up with your primary care provider for chronic medical issues or annual physicals.       Patient Instructions       Follow up with PCP in 3-5 days.  Proceed to  ER if symptoms worsen.    If tests are performed, our office will contact you with results only if changes need to made to the care plan discussed with you at the visit. [facility]    Chief Complaint     Chief Complaint   Patient presents with    Cough     Pt has has cough and congestion for since Wednesday. Started as only a sore throat. Low grade fevers. History of bronchitis and is worried she has it again. OTC- dayquil (no relief)          History of Present Illness       Aundrea Núñez is a(n) 28 y.o. female presenting with URI symptoms x 3 days  Past medical history: breast cancer  Congestion: yes  Sore throat: yes  Cough: yes  Sputum production: no  Fever:  subjective  Body aches: yes  Loss of smell/taste: no  GI symptoms: no  Known sick contacts: no  OTC meds tried: advil cold/sinus  Feels like past episodes of bronchitis        Review of Systems   Review of Systems   Constitutional:  Positive for chills and diaphoresis. Negative for fatigue.   HENT:  Positive for congestion and sore throat. Negative for ear pain, postnasal drip, rhinorrhea, sinus pain, sneezing and trouble swallowing.    Eyes:  Negative for pain and redness.   Respiratory:  Positive for cough and chest tightness. Negative for shortness of breath and wheezing.    Cardiovascular:  Negative for chest pain and leg swelling.   Gastrointestinal:  Negative for diarrhea, nausea and vomiting.   Musculoskeletal:  Positive for myalgias.   Neurological:  Negative for dizziness, weakness and headaches.         Current Medications       Current Outpatient Medications:     albuterol (Proventil HFA) 90 mcg/act inhaler, Inhale 2 puffs every 4 (four) hours as needed for wheezing or shortness of breath (persistent cough), Disp: 6.7 g, Rfl: 0    albuterol (PROVENTIL HFA,VENTOLIN HFA) 90 mcg/act inhaler, Inhale 2 puffs every 4 (four) hours as needed for wheezing, Disp: 8 g, Rfl: 0    cetirizine (ZyrTEC) 10 mg tablet, Take 10 mg by mouth daily, Disp: , Rfl:     drospirenone-ethinyl estradiol (TIANNA) 3-0.02 MG per tablet, Take 1 tablet by mouth daily, Disp: 84 tablet, Rfl: 1    fluticasone (FLONASE) 50 mcg/act nasal spray, 2 sprays into each nostril daily, Disp: 16 g, Rfl: 0    predniSONE 20 mg tablet, Take 1 tablet (20 mg total) by mouth daily for 4 days, Disp: 4 tablet, Rfl: 0    Current Allergies     Allergies as of 09/07/2024    (No Known Allergies)            The following portions of the patient's history were reviewed and updated as appropriate: allergies, current medications, past family history, past medical history, past social history, past surgical history and problem list.     Past Medical History:   Diagnosis  Date    Acute post-traumatic stress disorder     Cancer (HCC)     Breast     Concussion     resolved 12/20/2016       Past Surgical History:   Procedure Laterality Date    NO PAST SURGERIES      REDUCTION MAMMAPLASTY Bilateral 02/2021       Family History   Problem Relation Age of Onset    Allergic rhinitis Father     Breast cancer Maternal Grandmother     Other Paternal Grandmother         Surgery Complication     Alcohol abuse Paternal Grandfather          Medications have been verified.        Objective   /68   Pulse 76   Temp 98.1 °F (36.7 °C)   Resp 18   Wt 80.6 kg (177 lb 9.6 oz)   LMP 08/02/2024 (Approximate)   SpO2 99%   BMI 29.55 kg/m²        Physical Exam     Physical Exam  Vitals and nursing note reviewed.   Constitutional:       General: She is not in acute distress.     Appearance: Normal appearance. She is not toxic-appearing.   HENT:      Head: Normocephalic and atraumatic.      Nose: No congestion.      Mouth/Throat:      Mouth: Mucous membranes are moist.      Pharynx: Oropharynx is clear. No oropharyngeal exudate or posterior oropharyngeal erythema.   Eyes:      Conjunctiva/sclera: Conjunctivae normal.      Pupils: Pupils are equal, round, and reactive to light.   Cardiovascular:      Rate and Rhythm: Normal rate and regular rhythm.      Heart sounds: Normal heart sounds.   Pulmonary:      Effort: Pulmonary effort is normal. No respiratory distress.      Breath sounds: Normal breath sounds. No wheezing, rhonchi or rales.   Musculoskeletal:      Cervical back: Normal range of motion and neck supple.   Lymphadenopathy:      Cervical: No cervical adenopathy.   Skin:     General: Skin is warm and dry.      Capillary Refill: Capillary refill takes less than 2 seconds.   Neurological:      Mental Status: She is alert and oriented to person, place, and time.   Psychiatric:         Behavior: Behavior normal.

## 2024-09-20 ENCOUNTER — OFFICE VISIT (OUTPATIENT)
Dept: FAMILY MEDICINE CLINIC | Facility: CLINIC | Age: 29
End: 2024-09-20
Payer: COMMERCIAL

## 2024-09-20 VITALS
BODY MASS INDEX: 29.29 KG/M2 | DIASTOLIC BLOOD PRESSURE: 80 MMHG | TEMPERATURE: 99.1 F | RESPIRATION RATE: 18 BRPM | WEIGHT: 175.8 LBS | HEIGHT: 65 IN | HEART RATE: 68 BPM | SYSTOLIC BLOOD PRESSURE: 110 MMHG

## 2024-09-20 DIAGNOSIS — J20.9 ACUTE BRONCHITIS, UNSPECIFIED ORGANISM: Primary | ICD-10-CM

## 2024-09-20 PROCEDURE — 99213 OFFICE O/P EST LOW 20 MIN: CPT | Performed by: NURSE PRACTITIONER

## 2024-09-20 RX ORDER — BENZONATATE 200 MG/1
200 CAPSULE ORAL 3 TIMES DAILY PRN
Qty: 20 CAPSULE | Refills: 0 | Status: SHIPPED | OUTPATIENT
Start: 2024-09-20

## 2024-09-20 RX ORDER — AZITHROMYCIN 250 MG/1
TABLET, FILM COATED ORAL
Qty: 6 TABLET | Refills: 0 | Status: SHIPPED | OUTPATIENT
Start: 2024-09-20 | End: 2024-09-25

## 2024-09-20 NOTE — PROGRESS NOTES
Ambulatory Visit  Name: Aundrea Núñez      : 1995      MRN: [id number]  Encounter Provider: PATTI Mcbride  Encounter Date: 2024   Encounter department: [facility]    Assessment & Plan  Acute bronchitis, unspecified organism  Will treat as below.  Reviewed symptomatic treatment.  Follow-up as needed for any persistent or worsening symptoms  Orders:    azithromycin (ZITHROMAX) 250 mg tablet; 2 tabs PO day 1, then 1 tab PO days 2-5    benzonatate (TESSALON) 200 MG capsule; Take 1 capsule (200 mg total) by mouth 3 (three) times a day as needed for cough       History of Present Illness     She has been sick for the past 3 weeks with a cough.  Had sinus congestion initially, which has improved.  Cough was also productive, however no dry, associated with shortness of breath, and wheezing.  No fevers.  She was seen at urgent care and put on a prednisone burst and an inhaler.    Cough  This is a recurrent problem. The current episode started 1 to 4 weeks ago. The problem has been waxing and waning. The problem occurs every few minutes. The cough is Non-productive. Associated symptoms include chest pain, headaches, nasal congestion, rhinorrhea, shortness of breath and wheezing. Pertinent negatives include no chills, ear congestion, ear pain, fever, heartburn, hemoptysis, myalgias, postnasal drip, rash, sore throat, sweats or weight loss. The symptoms are aggravated by cold air, dust, exercise and lying down. Risk factors for lung disease include occupational exposure.         Review of Systems   Constitutional:  Negative for chills, fever and weight loss.   HENT:  Positive for rhinorrhea. Negative for ear pain, postnasal drip and sore throat.    Respiratory:  Positive for cough, shortness of breath and wheezing. Negative for hemoptysis.    Cardiovascular:  Positive for chest pain.   Gastrointestinal:  Negative for heartburn.   Musculoskeletal:  Negative for myalgias.   Skin:  Negative  "for rash.   Neurological:  Positive for headaches.           Objective     /80   Pulse 68   Temp 99.1 °F (37.3 °C) (Tympanic)   Resp 18   Ht 5' 5\" (1.651 m)   Wt 79.7 kg (175 lb 12.8 oz)   LMP 08/02/2024 (Approximate)   BMI 29.25 kg/m²     Physical Exam  Vitals and nursing note reviewed.   Constitutional:       General: She is not in acute distress.     Appearance: Normal appearance.   HENT:      Head: Normocephalic and atraumatic.      Right Ear: Tympanic membrane normal.      Left Ear: Tympanic membrane normal.   Eyes:      Conjunctiva/sclera: Conjunctivae normal.   Neck:      Vascular: No carotid bruit.   Cardiovascular:      Rate and Rhythm: Normal rate and regular rhythm.      Pulses: Normal pulses.      Heart sounds: Normal heart sounds. No murmur heard.  Pulmonary:      Effort: Pulmonary effort is normal.      Breath sounds: Normal breath sounds.   Skin:     General: Skin is warm and dry.   Neurological:      Mental Status: She is alert.   Psychiatric:         Mood and Affect: Mood normal.         Behavior: Behavior normal.         "

## 2024-09-22 DIAGNOSIS — Z30.011 ENCOUNTER FOR PRESCRIPTION OF ORAL CONTRACEPTIVES: ICD-10-CM

## 2024-09-23 RX ORDER — DROSPIRENONE AND ETHINYL ESTRADIOL 0.02-3(28)
1 KIT ORAL DAILY
Qty: 84 TABLET | Refills: 1 | Status: SHIPPED | OUTPATIENT
Start: 2024-09-23

## 2024-12-09 DIAGNOSIS — Z30.011 ENCOUNTER FOR PRESCRIPTION OF ORAL CONTRACEPTIVES: ICD-10-CM

## 2024-12-09 RX ORDER — DROSPIRENONE AND ETHINYL ESTRADIOL 0.02-3(28)
1 KIT ORAL DAILY
Qty: 84 TABLET | Refills: 0 | Status: CANCELLED | OUTPATIENT
Start: 2024-12-09

## 2025-02-27 DIAGNOSIS — Z30.011 ENCOUNTER FOR PRESCRIPTION OF ORAL CONTRACEPTIVES: ICD-10-CM

## 2025-02-28 RX ORDER — DROSPIRENONE AND ETHINYL ESTRADIOL TABLETS 0.02-3(28)
1 KIT ORAL DAILY
Qty: 84 TABLET | Refills: 1 | Status: SHIPPED | OUTPATIENT
Start: 2025-02-28

## 2025-04-16 ENCOUNTER — APPOINTMENT (OUTPATIENT)
Dept: LAB | Facility: HOSPITAL | Age: 30
End: 2025-04-16
Attending: NURSE PRACTITIONER
Payer: COMMERCIAL

## 2025-04-16 ENCOUNTER — OFFICE VISIT (OUTPATIENT)
Dept: FAMILY MEDICINE CLINIC | Facility: CLINIC | Age: 30
End: 2025-04-16
Payer: COMMERCIAL

## 2025-04-16 VITALS
DIASTOLIC BLOOD PRESSURE: 74 MMHG | BODY MASS INDEX: 28.77 KG/M2 | RESPIRATION RATE: 16 BRPM | WEIGHT: 179 LBS | HEART RATE: 68 BPM | TEMPERATURE: 97 F | HEIGHT: 66 IN | SYSTOLIC BLOOD PRESSURE: 104 MMHG

## 2025-04-16 DIAGNOSIS — Z00.00 ROUTINE ADULT HEALTH MAINTENANCE: Primary | ICD-10-CM

## 2025-04-16 DIAGNOSIS — Z01.419 ENCOUNTER FOR CERVICAL PAP SMEAR WITH PELVIC EXAM: ICD-10-CM

## 2025-04-16 DIAGNOSIS — Z30.011 ENCOUNTER FOR PRESCRIPTION OF ORAL CONTRACEPTIVES: ICD-10-CM

## 2025-04-16 DIAGNOSIS — Z13.6 SCREENING FOR CARDIOVASCULAR CONDITION: ICD-10-CM

## 2025-04-16 DIAGNOSIS — B35.9 DERMATOPHYTOSIS: ICD-10-CM

## 2025-04-16 DIAGNOSIS — Z13.29 SCREENING FOR THYROID DISORDER: ICD-10-CM

## 2025-04-16 DIAGNOSIS — J45.990 EXERCISE-INDUCED BRONCHOSPASM: ICD-10-CM

## 2025-04-16 LAB
ALBUMIN SERPL BCG-MCNC: 4.1 G/DL (ref 3.5–5)
ALP SERPL-CCNC: 35 U/L (ref 34–104)
ALT SERPL W P-5'-P-CCNC: 12 U/L (ref 7–52)
ANION GAP SERPL CALCULATED.3IONS-SCNC: 9 MMOL/L (ref 4–13)
AST SERPL W P-5'-P-CCNC: 16 U/L (ref 13–39)
BASOPHILS # BLD AUTO: 0.05 THOUSANDS/ÂΜL (ref 0–0.1)
BASOPHILS NFR BLD AUTO: 1 % (ref 0–1)
BILIRUB SERPL-MCNC: 0.44 MG/DL (ref 0.2–1)
BUN SERPL-MCNC: 10 MG/DL (ref 5–25)
CALCIUM SERPL-MCNC: 9.5 MG/DL (ref 8.4–10.2)
CHLORIDE SERPL-SCNC: 105 MMOL/L (ref 96–108)
CHOLEST SERPL-MCNC: 173 MG/DL (ref ?–200)
CO2 SERPL-SCNC: 24 MMOL/L (ref 21–32)
CREAT SERPL-MCNC: 0.74 MG/DL (ref 0.6–1.3)
EOSINOPHIL # BLD AUTO: 0.06 THOUSAND/ÂΜL (ref 0–0.61)
EOSINOPHIL NFR BLD AUTO: 1 % (ref 0–6)
ERYTHROCYTE [DISTWIDTH] IN BLOOD BY AUTOMATED COUNT: 11.8 % (ref 11.6–15.1)
GFR SERPL CREATININE-BSD FRML MDRD: 110 ML/MIN/1.73SQ M
GLUCOSE P FAST SERPL-MCNC: 94 MG/DL (ref 65–99)
HCT VFR BLD AUTO: 40.9 % (ref 34.8–46.1)
HDLC SERPL-MCNC: 71 MG/DL
HGB BLD-MCNC: 13.8 G/DL (ref 11.5–15.4)
IMM GRANULOCYTES # BLD AUTO: 0.01 THOUSAND/UL (ref 0–0.2)
IMM GRANULOCYTES NFR BLD AUTO: 0 % (ref 0–2)
LDLC SERPL CALC-MCNC: 81 MG/DL (ref 0–100)
LYMPHOCYTES # BLD AUTO: 2.21 THOUSANDS/ÂΜL (ref 0.6–4.47)
LYMPHOCYTES NFR BLD AUTO: 31 % (ref 14–44)
MCH RBC QN AUTO: 28.8 PG (ref 26.8–34.3)
MCHC RBC AUTO-ENTMCNC: 33.7 G/DL (ref 31.4–37.4)
MCV RBC AUTO: 85 FL (ref 82–98)
MONOCYTES # BLD AUTO: 0.41 THOUSAND/ÂΜL (ref 0.17–1.22)
MONOCYTES NFR BLD AUTO: 6 % (ref 4–12)
NEUTROPHILS # BLD AUTO: 4.47 THOUSANDS/ÂΜL (ref 1.85–7.62)
NEUTS SEG NFR BLD AUTO: 61 % (ref 43–75)
NRBC BLD AUTO-RTO: 0 /100 WBCS
PLATELET # BLD AUTO: 203 THOUSANDS/UL (ref 149–390)
PMV BLD AUTO: 10.3 FL (ref 8.9–12.7)
POTASSIUM SERPL-SCNC: 4.1 MMOL/L (ref 3.5–5.3)
PROT SERPL-MCNC: 6.9 G/DL (ref 6.4–8.4)
RBC # BLD AUTO: 4.8 MILLION/UL (ref 3.81–5.12)
SODIUM SERPL-SCNC: 138 MMOL/L (ref 135–147)
TRIGL SERPL-MCNC: 106 MG/DL (ref ?–150)
TSH SERPL DL<=0.05 MIU/L-ACNC: 1.58 UIU/ML (ref 0.45–4.5)
WBC # BLD AUTO: 7.21 THOUSAND/UL (ref 4.31–10.16)

## 2025-04-16 PROCEDURE — G0145 SCR C/V CYTO,THINLAYER,RESCR: HCPCS | Performed by: NURSE PRACTITIONER

## 2025-04-16 PROCEDURE — 80053 COMPREHEN METABOLIC PANEL: CPT

## 2025-04-16 PROCEDURE — 85025 COMPLETE CBC W/AUTO DIFF WBC: CPT

## 2025-04-16 PROCEDURE — 80061 LIPID PANEL: CPT

## 2025-04-16 PROCEDURE — 36415 COLL VENOUS BLD VENIPUNCTURE: CPT

## 2025-04-16 PROCEDURE — 84443 ASSAY THYROID STIM HORMONE: CPT

## 2025-04-16 PROCEDURE — 99395 PREV VISIT EST AGE 18-39: CPT | Performed by: NURSE PRACTITIONER

## 2025-04-16 RX ORDER — CLOTRIMAZOLE AND BETAMETHASONE DIPROPIONATE 10; .64 MG/G; MG/G
CREAM TOPICAL 2 TIMES DAILY
Qty: 45 G | Refills: 0 | Status: SHIPPED | OUTPATIENT
Start: 2025-04-16

## 2025-04-16 RX ORDER — DROSPIRENONE AND ETHINYL ESTRADIOL 0.02-3(28)
1 KIT ORAL DAILY
Qty: 84 TABLET | Refills: 3 | Status: SHIPPED | OUTPATIENT
Start: 2025-04-16

## 2025-04-16 NOTE — PROGRESS NOTES
Adult Annual Physical  Name: Aundrea Núñez      : 1995      MRN: [id number]  Encounter Provider: PATTI Mcbride  Encounter Date: 2025   Encounter department: [facility]    :  Assessment & Plan  Routine adult health maintenance         Encounter for cervical Pap smear with pelvic exam    Orders:  •  Liquid-based pap, screening    Screening for cardiovascular condition    Orders:  •  Comprehensive metabolic panel; Future  •  CBC and differential; Future  •  Lipid Panel with Direct LDL reflex; Future    Screening for thyroid disorder    Orders:  •  TSH, 3rd generation with Free T4 reflex; Future    Exercise-induced bronchospasm         Dermatophytosis    Orders:  •  clotrimazole-betamethasone (LOTRISONE) 1-0.05 % cream; Apply topically 2 (two) times a day    Encounter for prescription of oral contraceptives    Orders:  •  drospirenone-ethinyl estradiol (Loryna) 3-0.02 MG per tablet; Take 1 tablet by mouth daily            Immunizations:  - Immunizations due: Influenza      Depression Screening and Follow-up Plan: Patient's depression screening was positive with a PHQ-9 score of 7.           History of Present Illness     Adult Annual Physical:  Patient presents for annual physical. Here today for CPE.     Diet and Physical Activity:  - Diet/Nutrition: no special diet.  - Exercise: walking, moderate cardiovascular exercise, 1-2 times a week on average and 30-60 minutes on average.    Depression Screening:    - PHQ-9 Score: 7    General Health:  - Sleep: sleeps poorly, 4-6 hours of sleep on average and unrefreshing sleep.  - Hearing: tinnitus.  - Vision: most recent eye exam < 1 year ago and wears contacts.  - Dental: no dental visits for > 1 year and brushes teeth twice daily.    /GYN Health:  - Follows with GYN: no.   - Last menstrual cycle: 3/2/2025.   - History of STDs: no  - Contraception: oral contraceptives.      Advanced Care Planning:  - Has an advanced directive?: no   "  - Has a durable medical POA?: no      Review of Systems   Constitutional: Negative.    Respiratory: Negative.     Cardiovascular: Negative.    Gastrointestinal: Negative.    Neurological:  Positive for headaches.     Current Outpatient Medications on File Prior to Visit   Medication Sig Dispense Refill   • cetirizine (ZyrTEC) 10 mg tablet Take 10 mg by mouth daily     • fluticasone (FLONASE) 50 mcg/act nasal spray 2 sprays into each nostril daily 16 g 0   • [DISCONTINUED] albuterol (Proventil HFA) 90 mcg/act inhaler Inhale 2 puffs every 4 (four) hours as needed for wheezing or shortness of breath (persistent cough) 6.7 g 0   • [DISCONTINUED] drospirenone-ethinyl estradiol (Loryna) 3-0.02 MG per tablet Take 1 tablet by mouth once daily 84 tablet 1   • albuterol (PROVENTIL HFA,VENTOLIN HFA) 90 mcg/act inhaler Inhale 2 puffs every 4 (four) hours as needed for wheezing (Patient not taking: Reported on 4/16/2025) 8 g 0   • [DISCONTINUED] benzonatate (TESSALON) 200 MG capsule Take 1 capsule (200 mg total) by mouth 3 (three) times a day as needed for cough (Patient not taking: Reported on 4/16/2025) 20 capsule 0     No current facility-administered medications on file prior to visit.      Social History     Tobacco Use   • Smoking status: Never     Passive exposure: Never   • Smokeless tobacco: Never   Vaping Use   • Vaping status: Never Used   Substance and Sexual Activity   • Alcohol use: Not Currently     Comment: Socially    • Drug use: No   • Sexual activity: Yes     Partners: Male     Birth control/protection: Condom Male       Objective   /74   Pulse 68   Temp (!) 97 °F (36.1 °C)   Resp 16   Ht 5' 5.75\" (1.67 m)   Wt 81.2 kg (179 lb)   LMP 03/02/2025 (Exact Date)   BMI 29.11 kg/m²     Physical Exam  Vitals and nursing note reviewed.   Constitutional:       Appearance: Normal appearance. She is well-developed.   HENT:      Head: Normocephalic and atraumatic.      Right Ear: Tympanic membrane and " external ear normal.      Left Ear: Tympanic membrane and external ear normal.      Nose: Nose normal.      Mouth/Throat:      Pharynx: Oropharynx is clear.   Eyes:      Extraocular Movements: Extraocular movements intact.      Conjunctiva/sclera: Conjunctivae normal.      Pupils: Pupils are equal, round, and reactive to light.   Neck:      Thyroid: No thyromegaly.      Vascular: No carotid bruit.   Cardiovascular:      Rate and Rhythm: Normal rate and regular rhythm.      Pulses: Normal pulses.      Heart sounds: Normal heart sounds. No murmur heard.  Pulmonary:      Effort: Pulmonary effort is normal.      Breath sounds: Normal breath sounds.   Abdominal:      General: Bowel sounds are normal. There is no distension.      Palpations: Abdomen is soft.      Tenderness: There is no abdominal tenderness.   Genitourinary:     Labia:         Right: No rash or lesion.         Left: No rash or lesion.       Vagina: Normal.      Cervix: Normal.      Comments: Pap smear done  Musculoskeletal:         General: No deformity. Normal range of motion.      Cervical back: Normal range of motion and neck supple.   Lymphadenopathy:      Cervical: No cervical adenopathy.      Upper Body:      Right upper body: No axillary adenopathy.      Left upper body: No axillary adenopathy.   Skin:     General: Skin is warm and dry.      Capillary Refill: Capillary refill takes less than 2 seconds.   Neurological:      Mental Status: She is alert.      Sensory: No sensory deficit.      Motor: No abnormal muscle tone.      Coordination: Coordination normal.      Deep Tendon Reflexes: Reflexes normal.   Psychiatric:         Mood and Affect: Mood normal.         Behavior: Behavior normal.       Depression Screening Follow-up Plan: Patient's depression screening was positive with a PHQ-9 score of 7. Patient assessed for underlying major depression. They have no active suicidal ideations. Brief counseling provided and recommend additional  follow-up/re-evaluation next office visit.

## 2025-04-17 ENCOUNTER — RESULTS FOLLOW-UP (OUTPATIENT)
Dept: FAMILY MEDICINE CLINIC | Facility: CLINIC | Age: 30
End: 2025-04-17

## 2025-04-17 DIAGNOSIS — N76.0 BACTERIAL VAGINOSIS: Primary | ICD-10-CM

## 2025-04-17 DIAGNOSIS — B96.89 BACTERIAL VAGINOSIS: Primary | ICD-10-CM

## 2025-04-22 LAB
LAB AP GYN PRIMARY INTERPRETATION: NORMAL
Lab: NORMAL
PATH INTERP SPEC-IMP: NORMAL

## 2025-04-22 RX ORDER — METRONIDAZOLE 7.5 MG/G
1 GEL VAGINAL DAILY
Qty: 70 G | Refills: 0 | Status: SHIPPED | OUTPATIENT
Start: 2025-04-22 | End: 2025-04-27

## 2025-04-29 DIAGNOSIS — B35.9 DERMATOPHYTOSIS: ICD-10-CM

## 2025-04-29 DIAGNOSIS — Z30.011 ENCOUNTER FOR PRESCRIPTION OF ORAL CONTRACEPTIVES: ICD-10-CM

## 2025-04-30 RX ORDER — DROSPIRENONE AND ETHINYL ESTRADIOL 0.02-3(28)
1 KIT ORAL DAILY
Qty: 84 TABLET | Refills: 3 | Status: SHIPPED | OUTPATIENT
Start: 2025-04-30

## 2025-04-30 RX ORDER — CLOTRIMAZOLE AND BETAMETHASONE DIPROPIONATE 10; .64 MG/G; MG/G
CREAM TOPICAL 2 TIMES DAILY
Qty: 45 G | Refills: 0 | Status: SHIPPED | OUTPATIENT
Start: 2025-04-30